# Patient Record
Sex: MALE | Race: WHITE | Employment: FULL TIME | URBAN - METROPOLITAN AREA
[De-identification: names, ages, dates, MRNs, and addresses within clinical notes are randomized per-mention and may not be internally consistent; named-entity substitution may affect disease eponyms.]

---

## 2019-01-07 ENCOUNTER — ANESTHESIA EVENT (OUTPATIENT)
Dept: ENDOSCOPY | Age: 69
End: 2019-01-07
Payer: MEDICARE

## 2019-01-07 ENCOUNTER — HOSPITAL ENCOUNTER (OUTPATIENT)
Age: 69
Setting detail: OUTPATIENT SURGERY
Discharge: HOME | End: 2019-01-07
Attending: INTERNAL MEDICINE | Admitting: INTERNAL MEDICINE
Payer: MEDICARE

## 2019-01-07 ENCOUNTER — ANESTHESIA (OUTPATIENT)
Dept: ENDOSCOPY | Age: 69
End: 2019-01-07
Payer: MEDICARE

## 2019-01-07 VITALS
WEIGHT: 251.19 LBS | BODY MASS INDEX: 38.07 KG/M2 | HEART RATE: 83 BPM | OXYGEN SATURATION: 100 % | HEIGHT: 68 IN | TEMPERATURE: 97.6 F | SYSTOLIC BLOOD PRESSURE: 150 MMHG | DIASTOLIC BLOOD PRESSURE: 72 MMHG | RESPIRATION RATE: 19 BRPM

## 2019-01-07 PROCEDURE — 88305 TISSUE EXAM BY PATHOLOGIST: CPT

## 2019-01-07 PROCEDURE — 77030013992 HC SNR POLYP ENDOSC BSC -B: Performed by: INTERNAL MEDICINE

## 2019-01-07 PROCEDURE — 77030027957 HC TBNG IRR ENDOGTR BUSS -B: Performed by: INTERNAL MEDICINE

## 2019-01-07 PROCEDURE — 76040000019: Performed by: INTERNAL MEDICINE

## 2019-01-07 PROCEDURE — 74011250637 HC RX REV CODE- 250/637: Performed by: INTERNAL MEDICINE

## 2019-01-07 PROCEDURE — 76060000031 HC ANESTHESIA FIRST 0.5 HR: Performed by: INTERNAL MEDICINE

## 2019-01-07 PROCEDURE — 77030010936 HC CLP LIG BSC -C: Performed by: INTERNAL MEDICINE

## 2019-01-07 PROCEDURE — 77030009426 HC FCPS BIOP ENDOSC BSC -B: Performed by: INTERNAL MEDICINE

## 2019-01-07 PROCEDURE — 74011250636 HC RX REV CODE- 250/636

## 2019-01-07 RX ORDER — FLUMAZENIL 0.1 MG/ML
0.2 INJECTION INTRAVENOUS
Status: DISCONTINUED | OUTPATIENT
Start: 2019-01-07 | End: 2019-01-07 | Stop reason: HOSPADM

## 2019-01-07 RX ORDER — SODIUM CHLORIDE 9 MG/ML
100 INJECTION, SOLUTION INTRAVENOUS CONTINUOUS
Status: DISCONTINUED | OUTPATIENT
Start: 2019-01-07 | End: 2019-01-07 | Stop reason: HOSPADM

## 2019-01-07 RX ORDER — NALOXONE HYDROCHLORIDE 0.4 MG/ML
0.4 INJECTION, SOLUTION INTRAMUSCULAR; INTRAVENOUS; SUBCUTANEOUS
Status: DISCONTINUED | OUTPATIENT
Start: 2019-01-07 | End: 2019-01-07 | Stop reason: HOSPADM

## 2019-01-07 RX ORDER — MIDAZOLAM HYDROCHLORIDE 1 MG/ML
.25-1 INJECTION, SOLUTION INTRAMUSCULAR; INTRAVENOUS
Status: DISCONTINUED | OUTPATIENT
Start: 2019-01-07 | End: 2019-01-07 | Stop reason: HOSPADM

## 2019-01-07 RX ORDER — LEVOTHYROXINE SODIUM 125 UG/1
125 TABLET ORAL
COMMUNITY
End: 2019-04-23 | Stop reason: DRUGHIGH

## 2019-01-07 RX ORDER — EPINEPHRINE 0.1 MG/ML
1 INJECTION INTRACARDIAC; INTRAVENOUS
Status: DISCONTINUED | OUTPATIENT
Start: 2019-01-07 | End: 2019-01-07 | Stop reason: HOSPADM

## 2019-01-07 RX ORDER — ATROPINE SULFATE 0.1 MG/ML
0.5 INJECTION INTRAVENOUS
Status: DISCONTINUED | OUTPATIENT
Start: 2019-01-07 | End: 2019-01-07 | Stop reason: HOSPADM

## 2019-01-07 RX ORDER — LISINOPRIL 5 MG/1
5 TABLET ORAL DAILY
COMMUNITY

## 2019-01-07 RX ORDER — SODIUM CHLORIDE 0.9 % (FLUSH) 0.9 %
5-10 SYRINGE (ML) INJECTION EVERY 8 HOURS
Status: DISCONTINUED | OUTPATIENT
Start: 2019-01-07 | End: 2019-01-07 | Stop reason: HOSPADM

## 2019-01-07 RX ORDER — SODIUM CHLORIDE 9 MG/ML
INJECTION, SOLUTION INTRAVENOUS
Status: DISCONTINUED | OUTPATIENT
Start: 2019-01-07 | End: 2019-01-07 | Stop reason: HOSPADM

## 2019-01-07 RX ORDER — DEXTROMETHORPHAN/PSEUDOEPHED 2.5-7.5/.8
1.2 DROPS ORAL
Status: DISCONTINUED | OUTPATIENT
Start: 2019-01-07 | End: 2019-01-07 | Stop reason: HOSPADM

## 2019-01-07 RX ORDER — PROPOFOL 10 MG/ML
INJECTION, EMULSION INTRAVENOUS AS NEEDED
Status: DISCONTINUED | OUTPATIENT
Start: 2019-01-07 | End: 2019-01-07 | Stop reason: HOSPADM

## 2019-01-07 RX ORDER — FENTANYL CITRATE 50 UG/ML
200 INJECTION, SOLUTION INTRAMUSCULAR; INTRAVENOUS
Status: DISCONTINUED | OUTPATIENT
Start: 2019-01-07 | End: 2019-01-07 | Stop reason: HOSPADM

## 2019-01-07 RX ORDER — LIDOCAINE HYDROCHLORIDE 20 MG/ML
INJECTION, SOLUTION EPIDURAL; INFILTRATION; INTRACAUDAL; PERINEURAL AS NEEDED
Status: DISCONTINUED | OUTPATIENT
Start: 2019-01-07 | End: 2019-01-07 | Stop reason: HOSPADM

## 2019-01-07 RX ORDER — SODIUM CHLORIDE 0.9 % (FLUSH) 0.9 %
5-10 SYRINGE (ML) INJECTION AS NEEDED
Status: DISCONTINUED | OUTPATIENT
Start: 2019-01-07 | End: 2019-01-07 | Stop reason: HOSPADM

## 2019-01-07 RX ADMIN — SIMETHICONE 80 MG: 20 SUSPENSION/ DROPS ORAL at 11:02

## 2019-01-07 RX ADMIN — PROPOFOL 40 MG: 10 INJECTION, EMULSION INTRAVENOUS at 10:58

## 2019-01-07 RX ADMIN — PROPOFOL 30 MG: 10 INJECTION, EMULSION INTRAVENOUS at 11:15

## 2019-01-07 RX ADMIN — PROPOFOL 50 MG: 10 INJECTION, EMULSION INTRAVENOUS at 10:56

## 2019-01-07 RX ADMIN — PROPOFOL 30 MG: 10 INJECTION, EMULSION INTRAVENOUS at 11:13

## 2019-01-07 RX ADMIN — SODIUM CHLORIDE: 9 INJECTION, SOLUTION INTRAVENOUS at 10:53

## 2019-01-07 RX ADMIN — PROPOFOL 30 MG: 10 INJECTION, EMULSION INTRAVENOUS at 11:11

## 2019-01-07 RX ADMIN — PROPOFOL 30 MG: 10 INJECTION, EMULSION INTRAVENOUS at 11:03

## 2019-01-07 RX ADMIN — PROPOFOL 50 MG: 10 INJECTION, EMULSION INTRAVENOUS at 10:54

## 2019-01-07 RX ADMIN — PROPOFOL 30 MG: 10 INJECTION, EMULSION INTRAVENOUS at 11:01

## 2019-01-07 RX ADMIN — PROPOFOL 30 MG: 10 INJECTION, EMULSION INTRAVENOUS at 11:08

## 2019-01-07 RX ADMIN — PROPOFOL 30 MG: 10 INJECTION, EMULSION INTRAVENOUS at 11:06

## 2019-01-07 RX ADMIN — LIDOCAINE HYDROCHLORIDE 50 MG: 20 INJECTION, SOLUTION EPIDURAL; INFILTRATION; INTRACAUDAL; PERINEURAL at 10:54

## 2019-01-07 RX ADMIN — PROPOFOL 30 MG: 10 INJECTION, EMULSION INTRAVENOUS at 10:59

## 2019-01-07 NOTE — PROCEDURES
[address]  [facility], [address]      Colonoscopy Operative Report    Edil Lou  [id number]  1950      Procedure Type:   Colonoscopy with polypectomy (snare cautery), polypectomy (hot biopsy)     Indications:    Occult blood in stool   First colonoscopy    Pre-operative Diagnosis: see indication above    Post-operative Diagnosis:  See findings below    :  Sandi Bland MD      Referring Provider: Nahun Rodriguez MD      Sedation:  MAC anesthesia Propofol      Procedure Details:  After informed consent was obtained with all risks and benefits of procedure explained and preoperative exam completed, the patient was taken to the endoscopy suite and placed in the left lateral decubitus position. Upon sequential sedation as per above, a digital rectal exam was performed demonstrating internal hemorrhoids. The Olympus videocolonoscope  was inserted in the rectum and carefully advanced to the cecum, which was identified by the ileocecal valve and appendiceal orifice. The cecum was identified by the ileocecal valve and appendiceal orifice. The quality of preparation was good. The colonoscope was slowly withdrawn with careful evaluation between folds. Retroflexion in the rectum was completed . Findings:   Rectum: normal  Sigmoid: mild diverticulosis  2 cm polyp removed by hot snaring, one hemoclip placed at site  Descending Colon: 8 mm polyp removed by hot biopsy  Transverse Colon: 2 cm polyp removed by hot snaring, one hemoclip placed at site  Ascending Colon: 3 polyps ranging I size between 7 and 9 mm removed by hot biopsies  Cecum: normal        Specimen Removed:  as above    Complications: None. EBL:  None. Impression:    see findings    Recommendations: --Await pathology.       Recommendation for next colonscopy in 3 years  High fiber diet    Signed By: Sandi Bland MD     1/7/2019  11:20 AM

## 2019-01-07 NOTE — ANESTHESIA POSTPROCEDURE EVALUATION
Post-Anesthesia Evaluation and Assessment Patient: Ariana German MRN: [id number]  SSN: [id number] YOB: 1950  Age: 79 y.o. Sex: male I have evaluated the patient and they are stable and ready for discharge from the PACU. Cardiovascular Function/Vital Signs Visit Vitals BP (!) 147/113 Pulse 94 Temp 36.5 °C (97.7 °F) Resp 18 Ht 5' 8\" (1.727 m) Wt 113.9 kg (251 lb 3 oz) SpO2 98% BMI 38.19 kg/m² Patient is status post MAC anesthesia for Procedure(s): 
COLONOSCOPY 
ENDOSCOPIC POLYPECTOMY RESOLUTION CLIP. Nausea/Vomiting: None Postoperative hydration reviewed and adequate. Pain: 
Pain Scale 1: Numeric (0 - 10) (01/07/19 1040) Pain Intensity 1: 0 (01/07/19 1040) Managed Neurological Status: At baseline Mental Status, Level of Consciousness: Alert and  oriented to person, place, and time Pulmonary Status:  
O2 Device: CO2 nasal cannula (01/07/19 1117) Adequate oxygenation and airway patent Complications related to anesthesia: None Post-anesthesia assessment completed. No concerns Signed By: Betsy Cabrera MD   
 January 7, 2019 Procedure(s): 
COLONOSCOPY 
ENDOSCOPIC POLYPECTOMY RESOLUTION CLIP. 
 
<BSHSIANPOST> Visit Vitals BP (!) 147/113 Pulse 94 Temp 36.5 °C (97.7 °F) Resp 18 Ht 5' 8\" (1.727 m) Wt 113.9 kg (251 lb 3 oz) SpO2 98% BMI 38.19 kg/m²

## 2019-01-07 NOTE — PROGRESS NOTES

## 2019-01-07 NOTE — DISCHARGE INSTRUCTIONS
[address] [facility]  [id number]  1950    Discomfort:  Redness at IV site- apply warm compress to area; if redness or soreness persist- contact your physician  There may be a slight amount of blood passed from the rectum  Gaseous discomfort- walking, belching will help relieve any discomfort  You may not operate a vehicle for 12 hours  You may not engage in an occupation involving machinery or appliances for rest of today  You may not drink alcoholic beverages for at least 12 hours  Avoid making any critical decisions for at least 24 hour  DIET:  You may resume your regular diet - however -  remember your colon is empty and a heavy meal will produce gas. Avoid these foods:  vegetables, fried / greasy foods, carbonated drinks     ACTIVITY:  You may  resume your normal daily activities it is recommended that you spend the remainder of the day resting -  avoid any strenuous activity. CALL M.D.   ANY SIGN OF:   Increasing pain, nausea, vomiting  Abdominal distension (swelling)  New increased bleeding (oral or rectal)  Fever (chills)  Pain in chest area  Bloody discharge from nose or mouth  Shortness of breath      Follow-up Instructions:   Call Dr. Sindi Rodriguez for any questions or problems at [phone]   High fiber diet        ENDOSCOPY FINDINGS:   Your colonoscopy showed 6 polyps I removed and diverticulosis     Telephone # [phone]      Signed By: Sindi Rodriguez MD     1/7/2019  11:23 AM       DISCHARGE SUMMARY from Nurse    The following personal items collected during your admission are returned to you:   Dental Appliance: Dental Appliances: None  Vision: Visual Aid: Other (comment)  Hearing Aid:    Jewelry:    Clothing:    Other Valuables:    Valuables sent to safe:

## 2019-01-07 NOTE — H&P
The patient is a 79year old male who presents with a complaint of Hemoccult Positive Stool. The patient presents consultation at the request of Dr. Dunia Abraham). The onset of the hemoccult positive stool has been chronic. There has been no associated abdominal pain, change in bowel habits, change in caliber of stools, dizziness, easy bruising, family history of bleeding, family history of colon cancer, hard stools, heartburn, hematemesis, ingestion of beets, ingestion of bismuth, ingestion of iron pills, mucus or pus with stool, nausea, painful bowel movements, use of adrenal steroids, use of aspirin, use of indomethacin, use of anti-coagulants, vomiting, weight loss, constipation, diarrhea, straining on bowel movements, hematochezia, history of gastrointestinal bleeding, ulcer disease, use of NSAIDs, previous gastrointestinal bleeding or history of anemia. Note for \"Hemoccult Positive Stool\": he never had colonoscopy done before, he had positive blood test in stool Problem List/Past Medical Krishna Pak; 11/14/2018 9:32 AM) Blood in stool (578.1  K92.1)   Thyroid condition (246.9  E07.9)   
 
Past Surgical History Krishna Pak; 11/14/2018 9:32 AM) Non-Contributory Past Surgical History   
 
Allergies Krishna Pak; 11/14/2018 9:32 AM) No Known Drug Allergies  [11/05/2018]: 
No Known Allergies  [11/05/2018]: Medication History Krishna Pak; 11/14/2018 9:34 AM) Levothyroxine Sodium  (125MCG Capsule, Oral) Active. Lisinopril  (5MG Tablet, Oral) Active. Medications Reconciled  
 
Family History Krishna Pak; 11/14/2018 9:32 AM) Colon Polyps   First Degree Relatives. Social History Krishna Pak; 11/14/2018 9:32 AM) Blood Transfusion   No. 
Alcohol Use   Has never drank. Employment status   Full-time. Marital status   . Tobacco Use   Former smoker. Health Maintenance History Krishna Pak; 11/14/2018 9:32 AM) Flu Vaccine   none Pneumovax   none Review of Systems ([id number] Wicho Alexander; 11/14/2018 9:34 AM) General Not Present- Chronic Fatigue, Poor Appetite, Weight Gain and Weight Loss. Skin Not Present- Itching, Rash and Skin Color Changes. HEENT Not Present- Hearing Loss and Vertigo. Respiratory Not Present- Difficulty Breathing and TB exposure. Cardiovascular Not Present- Chest Pain, Use of Antibiotics before Dental Procedures and Use of Blood Thinners. Gastrointestinal Present- See HPI. Musculoskeletal Not Present- Arthritis, Hip Replacement Surgery and Knee Replacement Surgery. Neurological Not Present- Weakness. Psychiatric Not Present- Depression. Endocrine Present- Thyroid Problems. Not Present- Diabetes. Hematology Not Present- Anemia. Vitals Gissell [facility] MITCHELL Alexander; 11/14/2018 9:37 AM) 11/14/2018 9:31 AM 
Weight: 253 lb   Height: 68 in  
Body Surface Area: 2.26 m²   Body Mass Index: 38.47 kg/m²   
Pulse: 80 (Regular)    Resp.: 18 (Unlabored)    
BP: 132/72 (Sitting, Left Arm, Standard) Physical Exam (George Mckeon MD; 11/14/2018 1:00 PM) General 
Mental Status - Alert. General Appearance - Cooperative, Pleasant, Not in acute distress. Orientation - Oriented X3. Build & Nutrition - Well nourished and Well developed. Integumentary General Characteristics Overall examination of the patient's skin reveals - no rashes, no bruises and no spider angiomas. Color - normal coloration of skin. Head and Neck Neck Global Assessment - full range of motion and supple, no bruit auscultated on the right, no bruit auscultated on the left, non-tender, no lymphadenopathy. Thyroid Gland Characteristics - normal size and consistency. Eye Eyeball - Left - No Exophthalmos. Eyeball - Right - No Exophthalmos. Sclera/Conjunctiva - Left - No Jaundice. Sclera/Conjunctiva - Right - No Jaundice.  
 
Chest and Lung Exam 
 Chest and lung exam reveals  - quiet, even and easy respiratory effort with no use of accessory muscles. Auscultation Breath sounds - Normal. Adventitious sounds - No Adventitious sounds. Cardiovascular Auscultation Rhythm - Regular, No Tachycardia, No Bradycardia . Heart Sounds - Normal heart sounds , S1 WNL and S2 WNL, No S3, No Summation Gallop. Murmurs & Other Heart Sounds - Auscultation of the heart reveals - No Murmurs. Abdomen Palpation/Percussion Tenderness - Non-Tender. Rebound tenderness - No rebound. Rigidity (guarding) - No Rigidity. Dullness to percussion - No abnormal dullness to percussion. Liver - No hepatosplenomegaly. Abdominal Mass Palpable - No masses. Other Characteristics - No Ascites. Auscultation Auscultation of the abdomen reveals - Bowel sounds normal, No Abdominal bruits and No Succussion splash. Rectal - Did not examine. Peripheral Vascular Upper Extremity Inspection - Left - Normal - No Clubbing, No Cyanosis, No Edema, Pulses Intact. Right - Normal - No Clubbing, No Cyanosis, No Edema, Pulses Intact. Palpation - Edema - Left - No edema. Right - No edema. Lower Extremity Inspection - Left - Inspection Normal. Right - Inspection Normal. Palpation - Edema - Left - No edema. Right - No edema. Neurologic Neurologic evaluation reveals  - Cranial nerves grossly intact, no focal neurologic deficits. Motor Involuntary Movements - Asterixis - not present. Musculoskeletal 
Global Assessment Gait and Station - normal gait and station. Assessment & Plan Jennifer Charlton MD; 11/14/2018 1:01 PM) Blood in stool (578.1  K92.1) Impression: he never had colonoscopy done before, he had positive blood test in stool 
patient was agreeable to get colonoscopy to r/o any colonic neoplasm 
given suprep sample for prep Current Plans Colonoscopy (96313) (Discussed risks and benefits with the patient to include:; perforation, post polypectomy, or post biopsy bleeding, missed lesions, and sedation reactions.) Pt Education - How to access health information online: discussed with patient and provided information. Patient is to call me for any questions or concerns. Date of Surgery Update: 
Tania Moise was seen and examined. History and physical has been reviewed. The patient has been examined.  There have been no significant clinical changes since the completion of the originally dated History and Physical. 
 
Signed By: Yann Jones MD   
 January 7, 2019 10:26 AM

## 2019-04-23 ENCOUNTER — APPOINTMENT (OUTPATIENT)
Dept: GENERAL RADIOLOGY | Age: 69
DRG: 247 | End: 2019-04-23
Attending: EMERGENCY MEDICINE
Payer: MEDICARE

## 2019-04-23 ENCOUNTER — HOSPITAL ENCOUNTER (INPATIENT)
Age: 69
LOS: 2 days | Discharge: HOME HEALTH | DRG: 247 | End: 2019-04-25
Attending: EMERGENCY MEDICINE | Admitting: INTERNAL MEDICINE
Payer: MEDICARE

## 2019-04-23 DIAGNOSIS — I25.119 CORONARY ARTERY DISEASE INVOLVING NATIVE HEART WITH ANGINA PECTORIS, UNSPECIFIED VESSEL OR LESION TYPE (HCC): ICD-10-CM

## 2019-04-23 DIAGNOSIS — R07.9 CHEST PAIN, UNSPECIFIED TYPE: ICD-10-CM

## 2019-04-23 DIAGNOSIS — I21.4 NSTEMI (NON-ST ELEVATED MYOCARDIAL INFARCTION) (HCC): Primary | ICD-10-CM

## 2019-04-23 LAB
ALBUMIN SERPL-MCNC: 3.4 G/DL (ref 3.5–5)
ALBUMIN/GLOB SERPL: 0.8 {RATIO} (ref 1.1–2.2)
ALP SERPL-CCNC: 69 U/L (ref 45–117)
ALT SERPL-CCNC: 47 U/L (ref 12–78)
AMYLASE SERPL-CCNC: 23 U/L (ref 25–115)
ANION GAP SERPL CALC-SCNC: 6 MMOL/L (ref 5–15)
APTT PPP: 27.4 SEC (ref 22.1–32)
APTT PPP: 29.9 SEC (ref 22.1–32)
AST SERPL-CCNC: 29 U/L (ref 15–37)
ATRIAL RATE: 105 BPM
BASOPHILS # BLD: 0.1 K/UL (ref 0–0.1)
BASOPHILS NFR BLD: 1 % (ref 0–1)
BILIRUB SERPL-MCNC: 0.2 MG/DL (ref 0.2–1)
BNP SERPL-MCNC: 260 PG/ML
BNP SERPL-MCNC: 298 PG/ML
BUN SERPL-MCNC: 20 MG/DL (ref 6–20)
BUN/CREAT SERPL: 13 (ref 12–20)
CALCIUM SERPL-MCNC: 9.1 MG/DL (ref 8.5–10.1)
CALCULATED P AXIS, ECG09: 54 DEGREES
CALCULATED R AXIS, ECG10: 39 DEGREES
CALCULATED T AXIS, ECG11: 75 DEGREES
CHLORIDE SERPL-SCNC: 105 MMOL/L (ref 97–108)
CK MB CFR SERPL CALC: 2.1 % (ref 0–2.5)
CK MB SERPL-MCNC: 2.9 NG/ML (ref 5–25)
CK SERPL-CCNC: 136 U/L (ref 39–308)
CO2 SERPL-SCNC: 27 MMOL/L (ref 21–32)
COMMENT, HOLDF: NORMAL
CREAT SERPL-MCNC: 1.58 MG/DL (ref 0.7–1.3)
D DIMER PPP FEU-MCNC: <0.19 MG/L FEU (ref 0–0.65)
DIAGNOSIS, 93000: NORMAL
DIFFERENTIAL METHOD BLD: ABNORMAL
EOSINOPHIL # BLD: 0.2 K/UL (ref 0–0.4)
EOSINOPHIL NFR BLD: 2 % (ref 0–7)
ERYTHROCYTE [DISTWIDTH] IN BLOOD BY AUTOMATED COUNT: 13.2 % (ref 11.5–14.5)
EST. AVERAGE GLUCOSE BLD GHB EST-MCNC: 126 MG/DL
GLOBULIN SER CALC-MCNC: 4.3 G/DL (ref 2–4)
GLUCOSE SERPL-MCNC: 127 MG/DL (ref 65–100)
HBA1C MFR BLD: 6 % (ref 4.2–6.3)
HCT VFR BLD AUTO: 47.2 % (ref 36.6–50.3)
HGB BLD-MCNC: 16.1 G/DL (ref 12.1–17)
IMM GRANULOCYTES # BLD AUTO: 0.1 K/UL (ref 0–0.04)
IMM GRANULOCYTES NFR BLD AUTO: 1 % (ref 0–0.5)
INR PPP: 1 (ref 0.9–1.1)
LIPASE SERPL-CCNC: 52 U/L (ref 73–393)
LYMPHOCYTES # BLD: 1.8 K/UL (ref 0.8–3.5)
LYMPHOCYTES NFR BLD: 24 % (ref 12–49)
MAGNESIUM SERPL-MCNC: 2.3 MG/DL (ref 1.6–2.4)
MCH RBC QN AUTO: 32.6 PG (ref 26–34)
MCHC RBC AUTO-ENTMCNC: 34.1 G/DL (ref 30–36.5)
MCV RBC AUTO: 95.5 FL (ref 80–99)
MONOCYTES # BLD: 0.7 K/UL (ref 0–1)
MONOCYTES NFR BLD: 9 % (ref 5–13)
NEUTS SEG # BLD: 4.7 K/UL (ref 1.8–8)
NEUTS SEG NFR BLD: 63 % (ref 32–75)
NRBC # BLD: 0 K/UL (ref 0–0.01)
NRBC BLD-RTO: 0 PER 100 WBC
P-R INTERVAL, ECG05: 176 MS
PHOSPHATE SERPL-MCNC: 2.5 MG/DL (ref 2.6–4.7)
PLATELET # BLD AUTO: 259 K/UL (ref 150–400)
PMV BLD AUTO: 9.1 FL (ref 8.9–12.9)
POTASSIUM SERPL-SCNC: 3.8 MMOL/L (ref 3.5–5.1)
PROT SERPL-MCNC: 7.7 G/DL (ref 6.4–8.2)
PROTHROMBIN TIME: 9.9 SEC (ref 9–11.1)
Q-T INTERVAL, ECG07: 348 MS
QRS DURATION, ECG06: 72 MS
QTC CALCULATION (BEZET), ECG08: 459 MS
RBC # BLD AUTO: 4.94 M/UL (ref 4.1–5.7)
SAMPLES BEING HELD,HOLD: NORMAL
SODIUM SERPL-SCNC: 138 MMOL/L (ref 136–145)
THERAPEUTIC RANGE,PTTT: NORMAL SECS (ref 58–77)
THERAPEUTIC RANGE,PTTT: NORMAL SECS (ref 58–77)
TROPONIN I SERPL-MCNC: 0.13 NG/ML
TROPONIN I SERPL-MCNC: 0.2 NG/ML
TSH SERPL DL<=0.05 MIU/L-ACNC: 4.53 UIU/ML (ref 0.36–3.74)
VENTRICULAR RATE, ECG03: 105 BPM
WBC # BLD AUTO: 7.5 K/UL (ref 4.1–11.1)

## 2019-04-23 PROCEDURE — 74011250636 HC RX REV CODE- 250/636: Performed by: INTERNAL MEDICINE

## 2019-04-23 PROCEDURE — C1887 CATHETER, GUIDING: HCPCS | Performed by: INTERNAL MEDICINE

## 2019-04-23 PROCEDURE — C1769 GUIDE WIRE: HCPCS | Performed by: INTERNAL MEDICINE

## 2019-04-23 PROCEDURE — 92928 PRQ TCAT PLMT NTRAC ST 1 LES: CPT | Performed by: INTERNAL MEDICINE

## 2019-04-23 PROCEDURE — 83735 ASSAY OF MAGNESIUM: CPT

## 2019-04-23 PROCEDURE — 74011250637 HC RX REV CODE- 250/637: Performed by: INTERNAL MEDICINE

## 2019-04-23 PROCEDURE — 74011000258 HC RX REV CODE- 258: Performed by: INTERNAL MEDICINE

## 2019-04-23 PROCEDURE — 82550 ASSAY OF CK (CPK): CPT

## 2019-04-23 PROCEDURE — 77030013715 HC INFL SYS MRTM -B: Performed by: INTERNAL MEDICINE

## 2019-04-23 PROCEDURE — 85610 PROTHROMBIN TIME: CPT

## 2019-04-23 PROCEDURE — C1874 STENT, COATED/COV W/DEL SYS: HCPCS | Performed by: INTERNAL MEDICINE

## 2019-04-23 PROCEDURE — 74011000250 HC RX REV CODE- 250: Performed by: INTERNAL MEDICINE

## 2019-04-23 PROCEDURE — 77030010221 HC SPLNT WR POS TELE -B: Performed by: INTERNAL MEDICINE

## 2019-04-23 PROCEDURE — 93458 L HRT ARTERY/VENTRICLE ANGIO: CPT | Performed by: INTERNAL MEDICINE

## 2019-04-23 PROCEDURE — 82150 ASSAY OF AMYLASE: CPT

## 2019-04-23 PROCEDURE — C1894 INTRO/SHEATH, NON-LASER: HCPCS | Performed by: INTERNAL MEDICINE

## 2019-04-23 PROCEDURE — 99153 MOD SED SAME PHYS/QHP EA: CPT | Performed by: INTERNAL MEDICINE

## 2019-04-23 PROCEDURE — 83880 ASSAY OF NATRIURETIC PEPTIDE: CPT

## 2019-04-23 PROCEDURE — B2111ZZ FLUOROSCOPY OF MULTIPLE CORONARY ARTERIES USING LOW OSMOLAR CONTRAST: ICD-10-PCS | Performed by: INTERNAL MEDICINE

## 2019-04-23 PROCEDURE — 80053 COMPREHEN METABOLIC PANEL: CPT

## 2019-04-23 PROCEDURE — 85025 COMPLETE CBC W/AUTO DIFF WBC: CPT

## 2019-04-23 PROCEDURE — 77030004532 HC CATH ANGI DX IMP BSC -A: Performed by: INTERNAL MEDICINE

## 2019-04-23 PROCEDURE — 71046 X-RAY EXAM CHEST 2 VIEWS: CPT

## 2019-04-23 PROCEDURE — 99284 EMERGENCY DEPT VISIT MOD MDM: CPT

## 2019-04-23 PROCEDURE — 74011250636 HC RX REV CODE- 250/636

## 2019-04-23 PROCEDURE — 93005 ELECTROCARDIOGRAM TRACING: CPT

## 2019-04-23 PROCEDURE — 77030013744: Performed by: INTERNAL MEDICINE

## 2019-04-23 PROCEDURE — 99152 MOD SED SAME PHYS/QHP 5/>YRS: CPT | Performed by: INTERNAL MEDICINE

## 2019-04-23 PROCEDURE — 36415 COLL VENOUS BLD VENIPUNCTURE: CPT

## 2019-04-23 PROCEDURE — 83036 HEMOGLOBIN GLYCOSYLATED A1C: CPT

## 2019-04-23 PROCEDURE — C1725 CATH, TRANSLUMIN NON-LASER: HCPCS | Performed by: INTERNAL MEDICINE

## 2019-04-23 PROCEDURE — 84484 ASSAY OF TROPONIN QUANT: CPT

## 2019-04-23 PROCEDURE — 65660000000 HC RM CCU STEPDOWN

## 2019-04-23 PROCEDURE — 027034Z DILATION OF CORONARY ARTERY, ONE ARTERY WITH DRUG-ELUTING INTRALUMINAL DEVICE, PERCUTANEOUS APPROACH: ICD-10-PCS | Performed by: INTERNAL MEDICINE

## 2019-04-23 PROCEDURE — 74011636320 HC RX REV CODE- 636/320: Performed by: INTERNAL MEDICINE

## 2019-04-23 PROCEDURE — 85379 FIBRIN DEGRADATION QUANT: CPT

## 2019-04-23 PROCEDURE — 77030019569 HC BND COMPR RAD TERU -B: Performed by: INTERNAL MEDICINE

## 2019-04-23 PROCEDURE — 77030012468 HC VLV BLEEDBK CNTRL ABBT -B: Performed by: INTERNAL MEDICINE

## 2019-04-23 PROCEDURE — 84443 ASSAY THYROID STIM HORMONE: CPT

## 2019-04-23 PROCEDURE — 85730 THROMBOPLASTIN TIME PARTIAL: CPT

## 2019-04-23 PROCEDURE — 4A023N7 MEASUREMENT OF CARDIAC SAMPLING AND PRESSURE, LEFT HEART, PERCUTANEOUS APPROACH: ICD-10-PCS | Performed by: INTERNAL MEDICINE

## 2019-04-23 PROCEDURE — 84100 ASSAY OF PHOSPHORUS: CPT

## 2019-04-23 PROCEDURE — 83690 ASSAY OF LIPASE: CPT

## 2019-04-23 DEVICE — STENT RONYX30022W RESOLUTE ONYX 3.00X22
Type: IMPLANTABLE DEVICE | Site: HEART | Status: FUNCTIONAL
Brand: RESOLUTE ONYX™

## 2019-04-23 RX ORDER — SODIUM CHLORIDE 0.9 % (FLUSH) 0.9 %
SYRINGE (ML) INJECTION AS NEEDED
Status: DISCONTINUED | OUTPATIENT
Start: 2019-04-23 | End: 2019-04-23 | Stop reason: HOSPADM

## 2019-04-23 RX ORDER — METOPROLOL TARTRATE 25 MG/1
12.5 TABLET, FILM COATED ORAL EVERY 12 HOURS
Status: DISCONTINUED | OUTPATIENT
Start: 2019-04-23 | End: 2019-04-25 | Stop reason: HOSPADM

## 2019-04-23 RX ORDER — MIDAZOLAM HYDROCHLORIDE 1 MG/ML
INJECTION, SOLUTION INTRAMUSCULAR; INTRAVENOUS AS NEEDED
Status: DISCONTINUED | OUTPATIENT
Start: 2019-04-23 | End: 2019-04-23 | Stop reason: HOSPADM

## 2019-04-23 RX ORDER — SODIUM CHLORIDE 0.9 % (FLUSH) 0.9 %
5-40 SYRINGE (ML) INJECTION EVERY 8 HOURS
Status: DISCONTINUED | OUTPATIENT
Start: 2019-04-23 | End: 2019-04-25 | Stop reason: HOSPADM

## 2019-04-23 RX ORDER — FENTANYL CITRATE 50 UG/ML
INJECTION, SOLUTION INTRAMUSCULAR; INTRAVENOUS AS NEEDED
Status: DISCONTINUED | OUTPATIENT
Start: 2019-04-23 | End: 2019-04-23 | Stop reason: HOSPADM

## 2019-04-23 RX ORDER — SODIUM CHLORIDE 0.9 % (FLUSH) 0.9 %
5-40 SYRINGE (ML) INJECTION AS NEEDED
Status: DISCONTINUED | OUTPATIENT
Start: 2019-04-23 | End: 2019-04-25 | Stop reason: HOSPADM

## 2019-04-23 RX ORDER — VERAPAMIL HYDROCHLORIDE 2.5 MG/ML
INJECTION, SOLUTION INTRAVENOUS AS NEEDED
Status: DISCONTINUED | OUTPATIENT
Start: 2019-04-23 | End: 2019-04-23 | Stop reason: HOSPADM

## 2019-04-23 RX ORDER — SODIUM CHLORIDE, SODIUM LACTATE, POTASSIUM CHLORIDE, CALCIUM CHLORIDE 600; 310; 30; 20 MG/100ML; MG/100ML; MG/100ML; MG/100ML
75 INJECTION, SOLUTION INTRAVENOUS CONTINUOUS
Status: DISCONTINUED | OUTPATIENT
Start: 2019-04-23 | End: 2019-04-25 | Stop reason: HOSPADM

## 2019-04-23 RX ORDER — SODIUM CHLORIDE 9 MG/ML
INJECTION, SOLUTION INTRAVENOUS
Status: COMPLETED | OUTPATIENT
Start: 2019-04-23 | End: 2019-04-23

## 2019-04-23 RX ORDER — ACETAMINOPHEN 325 MG/1
650 TABLET ORAL
Status: DISCONTINUED | OUTPATIENT
Start: 2019-04-23 | End: 2019-04-25 | Stop reason: HOSPADM

## 2019-04-23 RX ORDER — LIDOCAINE HYDROCHLORIDE 10 MG/ML
INJECTION INFILTRATION; PERINEURAL AS NEEDED
Status: DISCONTINUED | OUTPATIENT
Start: 2019-04-23 | End: 2019-04-23 | Stop reason: HOSPADM

## 2019-04-23 RX ORDER — HEPARIN SODIUM 1000 [USP'U]/ML
INJECTION, SOLUTION INTRAVENOUS; SUBCUTANEOUS AS NEEDED
Status: DISCONTINUED | OUTPATIENT
Start: 2019-04-23 | End: 2019-04-23 | Stop reason: HOSPADM

## 2019-04-23 RX ORDER — LEVOTHYROXINE SODIUM 137 UG/1
137 TABLET ORAL
COMMUNITY

## 2019-04-23 RX ORDER — LEVOTHYROXINE SODIUM 125 UG/1
125 TABLET ORAL
Status: DISCONTINUED | OUTPATIENT
Start: 2019-04-23 | End: 2019-04-23 | Stop reason: DRUGHIGH

## 2019-04-23 RX ORDER — NITROGLYCERIN 0.4 MG/1
0.4 TABLET SUBLINGUAL
Status: DISCONTINUED | OUTPATIENT
Start: 2019-04-23 | End: 2019-04-25 | Stop reason: HOSPADM

## 2019-04-23 RX ORDER — IBUPROFEN 200 MG
200 TABLET ORAL AS NEEDED
COMMUNITY
End: 2019-04-25

## 2019-04-23 RX ORDER — HEPARIN SODIUM 10000 [USP'U]/100ML
8-25 INJECTION, SOLUTION INTRAVENOUS
Status: DISCONTINUED | OUTPATIENT
Start: 2019-04-23 | End: 2019-04-24 | Stop reason: ALTCHOICE

## 2019-04-23 RX ORDER — GUAIFENESIN 100 MG/5ML
81 LIQUID (ML) ORAL DAILY
Status: DISCONTINUED | OUTPATIENT
Start: 2019-04-23 | End: 2019-04-25 | Stop reason: HOSPADM

## 2019-04-23 RX ORDER — HEPARIN SODIUM 5000 [USP'U]/ML
4000 INJECTION, SOLUTION INTRAVENOUS; SUBCUTANEOUS ONCE
Status: COMPLETED | OUTPATIENT
Start: 2019-04-23 | End: 2019-04-23

## 2019-04-23 RX ORDER — SODIUM CHLORIDE 9 MG/ML
1.5 INJECTION, SOLUTION INTRAVENOUS CONTINUOUS
Status: DISPENSED | OUTPATIENT
Start: 2019-04-23 | End: 2019-04-23

## 2019-04-23 RX ORDER — ONDANSETRON 2 MG/ML
4 INJECTION INTRAMUSCULAR; INTRAVENOUS
Status: DISCONTINUED | OUTPATIENT
Start: 2019-04-23 | End: 2019-04-25 | Stop reason: HOSPADM

## 2019-04-23 RX ORDER — HEPARIN SODIUM 200 [USP'U]/100ML
INJECTION, SOLUTION INTRAVENOUS
Status: COMPLETED | OUTPATIENT
Start: 2019-04-23 | End: 2019-04-23

## 2019-04-23 RX ORDER — ACETAMINOPHEN 500 MG
500 TABLET ORAL
COMMUNITY

## 2019-04-23 RX ORDER — BISACODYL 5 MG
5 TABLET, DELAYED RELEASE (ENTERIC COATED) ORAL DAILY PRN
Status: DISCONTINUED | OUTPATIENT
Start: 2019-04-23 | End: 2019-04-25 | Stop reason: HOSPADM

## 2019-04-23 RX ORDER — CLOPIDOGREL 300 MG/1
TABLET, FILM COATED ORAL AS NEEDED
Status: DISCONTINUED | OUTPATIENT
Start: 2019-04-23 | End: 2019-04-23 | Stop reason: HOSPADM

## 2019-04-23 RX ORDER — MORPHINE SULFATE 2 MG/ML
2 INJECTION, SOLUTION INTRAMUSCULAR; INTRAVENOUS
Status: DISCONTINUED | OUTPATIENT
Start: 2019-04-23 | End: 2019-04-25 | Stop reason: HOSPADM

## 2019-04-23 RX ORDER — CLOPIDOGREL BISULFATE 75 MG/1
75 TABLET ORAL DAILY
Status: DISCONTINUED | OUTPATIENT
Start: 2019-04-24 | End: 2019-04-25 | Stop reason: HOSPADM

## 2019-04-23 RX ORDER — LISINOPRIL 5 MG/1
5 TABLET ORAL DAILY
Status: DISCONTINUED | OUTPATIENT
Start: 2019-04-23 | End: 2019-04-25 | Stop reason: HOSPADM

## 2019-04-23 RX ORDER — DIPHENHYDRAMINE HYDROCHLORIDE 50 MG/ML
INJECTION, SOLUTION INTRAMUSCULAR; INTRAVENOUS AS NEEDED
Status: DISCONTINUED | OUTPATIENT
Start: 2019-04-23 | End: 2019-04-23 | Stop reason: HOSPADM

## 2019-04-23 RX ADMIN — HEPARIN SODIUM AND DEXTROSE 8 UNITS/KG/HR: 10000; 5 INJECTION INTRAVENOUS at 04:13

## 2019-04-23 RX ADMIN — ASPIRIN 81 MG CHEWABLE TABLET 81 MG: 81 TABLET CHEWABLE at 08:42

## 2019-04-23 RX ADMIN — HEPARIN SODIUM 4000 UNITS: 5000 INJECTION, SOLUTION INTRAVENOUS; SUBCUTANEOUS at 04:11

## 2019-04-23 RX ADMIN — LEVOTHYROXINE SODIUM 137 MCG: 0.11 TABLET ORAL at 08:38

## 2019-04-23 RX ADMIN — Medication 10 ML: at 22:10

## 2019-04-23 RX ADMIN — SODIUM CHLORIDE, SODIUM LACTATE, POTASSIUM CHLORIDE, AND CALCIUM CHLORIDE 75 ML/HR: 600; 310; 30; 20 INJECTION, SOLUTION INTRAVENOUS at 07:39

## 2019-04-23 RX ADMIN — SODIUM CHLORIDE 1.5 ML/KG/HR: 900 INJECTION, SOLUTION INTRAVENOUS at 16:43

## 2019-04-23 RX ADMIN — HEPARIN SODIUM 4000 UNITS: 5000 INJECTION, SOLUTION INTRAVENOUS; SUBCUTANEOUS at 11:37

## 2019-04-23 RX ADMIN — METOPROLOL TARTRATE 12.5 MG: 25 TABLET ORAL at 08:42

## 2019-04-23 RX ADMIN — METOPROLOL TARTRATE 12.5 MG: 25 TABLET ORAL at 20:08

## 2019-04-23 RX ADMIN — ACETAMINOPHEN 650 MG: 325 TABLET ORAL at 17:45

## 2019-04-23 NOTE — Clinical Note
Lesion: Located in the Distal RCA. Re-inflated stent balloon used. First inflation pressure = 12 shanique; inflation time: 8 sec.  Stent Balloon

## 2019-04-23 NOTE — PROCEDURES
Cardiac Catheterization Procedure Note   Patient: Danny Moreno  MRN: [id number]  SSN: [id number]   YOB: 1950 Age: 79 y.o.   Sex: male    Date of Procedure: 4/23/2019   Pre-procedure Diagnosis: NSTEMI  Post-procedure Diagnosis: Coronary Artery Disease  Procedure: Left Heart Cath, PCI and to LAD  :  Dr. Joel Riggins MD    Assistant(s):  None  Anesthesia: Moderate Sedation   Estimated Blood Loss: Less than 10 mL   Specimens Removed: None  Findings: severe mid LAD stenosis with ABHILASH 2 flow, moderate mid CX stenosis, severe bifurcation lesion in the distal RCA with total occlusion of the PDA branch with ABHILASH 0 flow, normal LV EF at 60%, low LV end diastolic filling pressure, PCI of mid LAD with a 2.5 mm balloon then a 3.0 x 22 mm JUDI with an excellent angiographic result; will plan a staged PCI of RCA and iFR of the CX  Complications: None   Implants:  None  Signed by:  Joel Riggins MD  4/23/2019  3:42 PM

## 2019-04-23 NOTE — Clinical Note
Lesion located in the Mid LAD. Balloon inserted. Balloon inflated using multiple inflations inflation technique. Pressure = 16 shanique; Duration = 15 sec. Inflation 2: Pressure: 15 shanique; Duration: 5 sec. Inflation 3: Pressure: 15 shanique; Duration: 5 sec.

## 2019-04-23 NOTE — PROGRESS NOTES
1600 TRANSFER - IN REPORT: 
 
Verbal report received from MIKE Pond(name) on [facility]  being received from CCL(unit) for routine progression of care Report consisted of patients Situation, Background, Assessment and  
Recommendations(SBAR). Information from the following report(s) SBAR, Kardex, Procedure Summary and Cardiac Rhythm NSR was reviewed with the receiving nurse. Opportunity for questions and clarification was provided. Assessment completed upon patients arrival to unit and care assumed. 1635 Patient arrived to floor via stretcher. Ambulated to bed in room. Tele placed and verified with monitor tech. VSS. Right wrist assessed, clean dry and intact with no bleeding and no hematoma. 1735 2cc air released from TR band. No bleeding and no hematoama. 1756 2cc air released from TR band. No bleeding and no hematoma. 1830 2cc air released from TR band. No bleeding and no hematoma. 1850 2cc air released from TR band. No bleeding and no hematoma. 16245 [facility] from [address] No bleeding and no hematoma. 1930 Bedside and Verbal shift change report given to 2018 Yuniel Thakur (oncoming nurse) by Aleksandra Mayes (offgoing nurse). Report included the following information SBAR, Kardex, MAR and Cardiac Rhythm NSR.

## 2019-04-23 NOTE — PROGRESS NOTES
TRANSFER - OUT REPORT: 
 
[facility] Mal[facility] being transferred to [place] [address] room 462 for routine progression of care Report consisted of patients Situation, Background, Assessment and  
Recommendations(SBAR). Information from the following report(s) Kardex and Procedure Summary was reviewed with the receiving nurse. Opportunity for questions and clarification was provided.

## 2019-04-23 NOTE — Clinical Note
Lesion located in the Distal RCA. Balloon inserted. Balloon inflated using multiple inflations inflation technique.

## 2019-04-23 NOTE — ED TRIAGE NOTES
Patient arrives to the ED with c/o left side chest pain x 1 week, patient describes pain as sharp and non radiating, patient reports no cardiac history, no nausea or vomiting noted, states pain becomes worse with activity.

## 2019-04-23 NOTE — ED NOTES
Gave bedside report regarding, SBAR, MAR, and plan of care to Wolfgang Colón RN. Transfered care of patient to RN.

## 2019-04-23 NOTE — Clinical Note
Lesion located in the Distal RCA. Balloon inserted. Balloon inflated using multiple inflations inflation technique. Pressure = 8 shanique; Duration = 18 sec. Inflation 2: Pressure: 12 shanique; Duration: 14 sec.

## 2019-04-23 NOTE — Clinical Note
Lesion: Located in the Mid RCA. Re-inflated stent balloon used. First inflation pressure = 14 shanique; inflation time: 7 sec.  Stent Balloon

## 2019-04-23 NOTE — H&P
[address] HISTORY AND PHYSICAL Name:  Wes Mooney 
MR#:  [id number] :  1950 ACCOUNT #:  [de-identified] ADMIT DATE:  2019 PRIMARY CARE PHYSICIAN:  Mary Ann Munoz MD 
 
SOURCE OF INFORMATION:  Patient. CHIEF COMPLAINT:  Chest pain. HISTORY OF PRESENT ILLNESS:  This is a 62-year-old man with a past medical history significant for hypothyroidism, hypertension, was in his usual state of health until about a week ago when the patient developed chest pain. This is located at the left side of the chest, intermittent. The first episode of the chest pain occurred when the patient was cutting the grass and subsided after 3 minutes of rest.  The patient has been having difficulty with sleeping because of the pain. The patient stated that he has the stress test done in . He has not seen a cardiologist.  Since then the chest pain became constant in the last 24 to 48 hours, sharp pain associated with shortness of breath. No known aggravating or relieving factors. The patient was brought to the emergency room for further evaluation. When the patient arrived at the emergency room, the first set of troponin was positive. The patient was referred to the hospitalist service for evaluation for admission. No associated fever, rigors or chills. No record of prior admission to this hospital. 
 
PAST MEDICAL HISTORY:  Hypertension, hypothyroidism. ALLERGIES:  NO KNOWN DRUG ALLERGIES. MEDICATIONS:  Synthroid 137 mcg daily in the morning, lisinopril 5 mg daily. FAMILY HISTORY: This was reviewed. His father had hypertension. PAST SURGICAL HISTORY:  This is significant for bilateral eye surgery. SOCIAL HISTORY:  The patient is a former smoker. No history of alcohol abuse. REVIEW OF SYSTEMS: 
HEAD, EYES, EARS, NOSE AND THROAT:  No headache, no dizziness, no blurring of vision, no photophobia. RESPIRATORY SYSTEM:  This is positive for shortness of breath, no cough, no hemoptysis. CARDIOVASCULAR SYSTEM:  Positive for chest pain, no orthopnea, no palpitation. GASTROINTESTINAL SYSTEM:  No nausea, vomiting or diarrhea. No constipation. GENITOURINARY SYSTEM:  No dysuria, no urgency and no frequency. All other systems are reviewed and they are negative. PHYSICAL EXAMINATION: 
GENERAL APPEARANCE:  The patient appeared ill, in moderate distress. VITAL SIGNS:  On arrival at the emergency room temperature 98.4, pulse 105, respiratory rate 16, blood pressure 158/84, oxygen saturation 95% on room air. HEAD:  Normocephalic, atraumatic. EYES:  Abnormal movement noted in the left eye. EARS:  Normal external ears with no evidence of drainage. NOSE:  No deformity, no drainage. MOUTH AND THROAT:  No visible oral lesion. NECK:  Neck is supple. No JVD, no thyromegaly. CHEST:  Clear breath sounds. No wheezing, no crackles. HEART:  Normal S1 and S2, regular. No clinically appreciable murmur. ABDOMEN:  Soft, obese, nontender, normal bowel sounds. CNS:  Alert, oriented x3. No gross focal neurological deficit. EXTREMITIES:  No edema. Pulses 2+ bilaterally. MUSCULOSKELETAL SYSTEM:  No evidence of joint deformity and swelling. SKIN:  No active skin lesions seen in the exposed part of the body. PSYCHIATRY:  Normal mood and affect. LYMPHATIC SYSTEM:  No cervical lymphadenopathy. DIAGNOSTIC DATA:  EKG shows sinus tachycardia and frequent premature ventricular complexes. Chest x-ray shows bibasilar platelike atelectasis. LABORATORY DATA:  Hematology:  WBC 7.5, hemoglobin at 16.1, hematocrit 47.2, platelets 036. Cardiac profile troponin 0.13. Chemistry:  Sodium 138, potassium 3.8, chloride 105, CO2 27, glucose 127, BUN 20, creatinine 1.58, calcium 9.1, total bilirubin 0.2, ALT 47, AST 29, alkaline phosphatase 69, total protein at 7.7, albumin level 3.4, globulin at 4.3.  
 
ASSESSMENT: 
 1.  Suspected non-ST elevation myocardial infarction. 2.  Hypertension. 3.  Hypothyroidism. 4.  Hyperglycemia. 5.  Morbid obesity. 6.  Acute kidney injury. PLAN: 
1. Suspected non-ST elevation myocardial infarction. We will admit the patient for further evaluation and treatment. We will start the patient on full-dose heparin, beta-blocker, aspirin until when the patient is seen by the cardiologist to determine whether the patient has non-ST elevation myocardial infarction or not. We will trend cardiac markers. We will obtain echocardiogram.  This is the most likely cause of the patient's chest pain. The patient will also be evaluated for other atypical causes of chest pain. We will check amylase and lipase level as well as D-dimer to evaluate the patient for thromboembolism as a possible cause of chest pain. 2.  Hypertension. We will resume preadmission medication. We will monitor the patient's blood pressure closely. 3.  Hypothyroidism. We will continue with Synthroid. We will check a TSH level. 4.  Hyperglycemia. We will check hemoglobin A1c level. The patient has no history of diabetes. 5.  Morbid obesity. Dietary consult will be requested for dietary counseling. 6.  Acute kidney injury. We will carry out fluid therapy and monitor the patient's renal function. OTHER ISSUES:  Code status, the patient is a full code. The patient is on full-dose heparin, because of that, there is no need for DVT prophylaxis. FUNCTIONAL STATUS PRIOR TO ADMISSION:  The patient is ambulatory without assistance or device. Fausto Hernandez MD 
 
 
[id number] 
D:  04/23/2019 7:25 
T:  04/23/2019 7:30 
JOB #:  [id number] CC:  Fabian Valverde MD

## 2019-04-23 NOTE — ED NOTES
Pt appears disgruntled, stating, \"I want to get out of here. I haven't seen a doctor. I haven't had breakfast. I've been here seven hours. \" Patient educated regarding current diagnosis, given medication education, reviewed that MD had been there within the last four hours. Per dietary, tray coming shortly. Patient ambulatory to the bathroom.

## 2019-04-23 NOTE — Clinical Note
Lesion: Located in the Distal RCA. Stent inserted. Stent deployed. Single technique used. First inflation pressure = 12 shanique; inflation time: 14 sec.

## 2019-04-23 NOTE — Clinical Note
Lesion located in the Mid LAD. Balloon inserted. Balloon inflated using multiple inflations inflation technique. Pressure = 13 shanique; Duration = 20 sec. Inflation 2: Pressure: 13 shanique; Duration: 8 sec.

## 2019-04-23 NOTE — Clinical Note
Lesion: Located in the Mid LAD. Stent inserted. Re-inflated stent balloon and multiple inflations used. First inflation pressure = 14 shanique; inflation time: 16 sec. Second inflation pressure: 14 shanique; inflation time: 3 sec.  SECOND INFLATION STENT BALLOON

## 2019-04-23 NOTE — PROGRESS NOTES
Pt seen and examined this AM, admitted by Dr. Geraldine Johnson early this AM 
- pt with NSTEMI to cath lab today at 1pm

## 2019-04-23 NOTE — CONSULTS
Cardiology Note dictated # [id number]  Imp:  NSTEMI: peak trop 0.20, no CP at present  CKD 3, GFR 44   Recommendations:   Needs cath today; ate breakfast; on the schedule for 1 PM today. The procedure and possible complications were reviewed with Mr Anisha Wakefield including but not limited to: CVA, MI, bleeding and vascular trauma, emergency surgery and contrast reactions. The patient agrees to proceed.   Thank you for this referral.  Kristy Kelley MD

## 2019-04-23 NOTE — ED NOTES
Received bedside report in SBAR format, updated on [facility], recent results and plan of care from [facility]. Assumed care of patient.

## 2019-04-23 NOTE — Clinical Note
Lesion: Located in the Mid RCA. Stent inserted. Stent deployed. Single technique used. First inflation pressure = 14 shanique; inflation time: 14 sec.

## 2019-04-23 NOTE — PROGRESS NOTES
TRANSFER - IN REPORT: 
 
Verbal report received from [facility] on Brandie Buckley  being received from cath lab procedure area  for routine progression of care. Report consisted of patients Situation, Background, Assessment and Recommendations(SBAR). Information from the following report(s) Kardex and Procedure Summary was reviewed with the receiving clinician. Opportunity for questions and clarification was provided. Assessment completed upon patients arrival to [address] [place] [address] and care assumed. Cardiac Cath Lab Recovery Arrival Note: 
 
Brandie Buckley arrived to [facility] recovery area. Patient procedure= LHC. Patient on cardiac monitor, non-invasive blood pressure, SPO2 monitor. On RA . IV  of NS on pump at 50 ml/hr. Patient status doing well without problems. Patient is A&Ox 3. Patient reports no pain. PROCEDURE SITE CHECK: 
 
Procedure site:without bleeding and no hematoma, No pain/discomfort reported at procedure site. No change in patient status. Continue to monitor patient and status.

## 2019-04-23 NOTE — Clinical Note
Lesion located in the Mid RCA. Balloon inserted. Balloon inflated using multiple inflations inflation technique. Pressure = 8 shanique; Duration = 11 sec. Inflation 2: Pressure: 8 shanique; Duration: 7 sec.

## 2019-04-23 NOTE — ROUTINE PROCESS
TRANSFER - OUT REPORT: 
 
Verbal report given to Dejah(name) on Luba Murrieta  being transferred to cath lab(unit) for routine progression of care Report consisted of patients Situation, Background, Assessment and  
Recommendations(SBAR). Information from the following report(s) ED Summary and Recent Results was reviewed with the receiving nurse. Lines:  
Peripheral IV 04/23/19 Right Forearm (Active) Site Assessment Clean, dry, & intact 4/23/2019  1:51 AM  
Phlebitis Assessment 0 4/23/2019  1:51 AM  
Infiltration Assessment 0 4/23/2019  1:51 AM  
Dressing Status Clean, dry, & intact 4/23/2019  1:51 AM  
   
Peripheral IV 04/23/19 Right Antecubital (Active) Site Assessment Clean, dry, & intact 4/23/2019 12:03 PM  
Phlebitis Assessment 0 4/23/2019 12:03 PM  
Infiltration Assessment 0 4/23/2019 12:03 PM  
Dressing Status Clean, dry, & intact 4/23/2019 12:03 PM  
Dressing Type Transparent 4/23/2019 12:03 PM  
Alcohol Cap Used No 4/23/2019 12:03 PM  
  
 
Opportunity for questions and clarification was provided. Patient transported with: 
 [employer]

## 2019-04-23 NOTE — PROGRESS NOTES
Admission Medication Reconciliation: 
Information obtained from:  Patient/RxQuery Comments/Recommendations: Updated PTA meds/reviewed patient's allergies. 1)  Patient states he takes some prn OTC pain medications at home, denies any medication allergies at this time. 2)  Medication changes (since last review): Added 
-Acetaminophen 500mg 1tab po prn 
-Ibuprofen 200mg 1tab po prn 
 
Prescriptions medications consistent with previously documented list. 
  
 
Allergies:  Patient has no known allergies. Significant PMH/Disease States:  
Past Medical History:  
Diagnosis Date Thyroid disease Chief Complaint for this Admission: Chief Complaint Patient presents with Chest Pain Prior to Admission Medications:  
Prior to Admission Medications Prescriptions Last Dose Informant Patient Reported? Taking?  
acetaminophen (TYLENOL) 500 mg tablet   Yes Yes Sig: Take 500 mg by mouth every six (6) hours as needed for Pain. ibuprofen (MOTRIN) 200 mg tablet   Yes Yes Sig: Take 200 mg by mouth as needed for Pain.  
levothyroxine (SYNTHROID) 137 mcg tablet   Yes Yes Sig: Take 137 mcg by mouth every morning. lisinopril (PRINIVIL, ZESTRIL) 5 mg tablet   Yes Yes Sig: Take 5 mg by mouth daily. Facility-Administered Medications: None

## 2019-04-23 NOTE — PROGRESS NOTES
Cardiac Cath Lab Recovery Arrival Note: 
 
 
Lisa Mancera arrived to Cardiac Cath Lab, Recovery Area. Staff introduced to patient. Patient identifiers verified with NAME and DATE OF BIRTH. Procedure verified with patient. Consent forms reviewed and signed by patient or authorized representative and verified. Allergies verified. Patient and family oriented to department. Patient and family informed of procedure and plan of care. Questions answered with review. Patient prepped for procedure, per orders from physician, prior to arrival. 
 
Patient on cardiac monitor, non-invasive blood pressure, SPO2 monitor. On RA. Patient is A&Ox 4. Patient reports no pain. Patient in stretcher, in low position, with side rails up, call bell within reach, patient instructed to call if assistance as needed. Patient prep in: [address], [place] [address]. Patient family has pager # Family in: . Prep by: JR Arias RN

## 2019-04-23 NOTE — ED PROVIDER NOTES
79 y.o. male with past medical history significant for Thyroid disease, HTN who presents from home with chief complaint of chest pain. Pt states over the past week he has had intermittent episodes of sharp, left to mid-sternal chest pain with associated shortness of breath. He notes the first episode onset while he was cutting the grass and subsided after 3-4 with resting. His wife at bedside states over the past 3 days he has been taking Tums and Tylenol without any relief. She  Also notes over the past 3 nights the pt has been unable to lay flat secondary to the symptoms, keeping him from being able to sleep at night. Pt notes his last stress test was in the 1990s and he does not follow with a cardiologist. He denies any current chest pain. Pt specifically denies any fever, chills, cough, congestion, diaphoresis, abdominal pain, nausea, vomiting, diarrhea, dysuria, or urinary frequency. There are no other acute medical concerns at this time. PSHx: Significant for colonoscopy Social Hx: former tobacco use, negative EtOH use, negative Illicit Drug use PCP: Yu Gannon MD 
 
Note written by Aide Zamora, as dictated by Yvette Judd MD 1:38 AM 
 
The history is provided by the patient. No  was used. Past Medical History:  
Diagnosis Date  Thyroid disease Past Surgical History:  
Procedure Laterality Date  COLONOSCOPY N/A 1/7/2019 COLONOSCOPY performed by Lucrecia Stinson MD at [facility] ENDOSCOPY  
 HX HEENT    
 bilateral eye operations done about 50 years ago History reviewed. No pertinent family history. Social History Socioeconomic History  Marital status:  Spouse name: Not on file  Number of children: Not on file  Years of education: Not on file  Highest education level: Not on file Occupational History  Not on file Social Needs  Financial resource strain: Not on file  Food insecurity:  
  Worry: Not on file Inability: Not on file  Transportation needs:  
  Medical: Not on file Non-medical: Not on file Tobacco Use  Smoking status: Former Smoker Substance and Sexual Activity  Alcohol use: Not Currently  Drug use: Never  Sexual activity: Not on file Lifestyle  Physical activity:  
  Days per week: Not on file Minutes per session: Not on file  Stress: Not on file Relationships  Social connections:  
  Talks on phone: Not on file Gets together: Not on file Attends Latter-day service: Not on file Active member of club or organization: Not on file Attends meetings of clubs or organizations: Not on file Relationship status: Not on file  Intimate partner violence:  
  Fear of current or ex partner: Not on file Emotionally abused: Not on file Physically abused: Not on file Forced sexual activity: Not on file Other Topics Concern  Not on file Social History Narrative  Not on file ALLERGIES: Patient has no known allergies. Review of Systems Constitutional: Negative for appetite change and fever. HENT: Negative for congestion, nosebleeds and sore throat. Eyes: Negative for discharge. Respiratory: Positive for shortness of breath. Negative for cough. Cardiovascular: Positive for chest pain. Gastrointestinal: Negative for abdominal pain, diarrhea, nausea and vomiting. Genitourinary: Negative for dysuria. Musculoskeletal: Negative. Skin: Negative for rash. Neurological: Negative for weakness and headaches. Hematological: Negative for adenopathy. Psychiatric/Behavioral: Positive for sleep disturbance. All other systems reviewed and are negative. Vitals:  
 04/23/19 0128 BP: 158/84 Pulse: (!) 105 Resp: 16 Temp: 98.4 °F (36.9 °C) SpO2: 95% Weight: 117.6 kg (259 lb 4.2 oz) Height: 5' 8\" (1.727 m) Physical Exam  
 Constitutional: He is oriented to person, place, and time. He appears well-developed and well-nourished. HENT:  
Head: Normocephalic and atraumatic. Mouth/Throat: Oropharynx is clear and moist.  
Eyes: Conjunctivae are normal.  
Neck: Normal range of motion. Neck supple. Cardiovascular: Normal rate, regular rhythm and normal heart sounds. Pulmonary/Chest: Effort normal and breath sounds normal.  
Abdominal: Soft. Bowel sounds are normal. There is no tenderness. Musculoskeletal: Normal range of motion. He exhibits no edema or tenderness. Trace BLE edema Neurological: He is alert and oriented to person, place, and time. Skin: Skin is warm and dry. Psychiatric: He has a normal mood and affect. His behavior is normal.  
Nursing note and vitals reviewed. Note written by Aide Burnette, as dictated by Von Cast MD 1:38 AM 
 
MDM Procedures ED EKG interpretation: 
Rhythm: sinus tachycardia; and regular . Rate (approx.): 105; Axis: normal; P wave: normal; QRS interval: normal ; ST/T wave: non-specific changes;EKG documented by Viv Rivera MD, scribe, as interpreted by Von Cast MD, ED MD. Hospitalist Madeline for Admission 3:07 AM - Dr. Junior Romero 
 
ED Room Number: ER09/09 Patient Name and age:  Shree Shirley 79 y.o.  male Working Diagnosis: 1. NSTEMI (non-ST elevated myocardial infarction) ([facility] [address] Readmission: no 
Isolation Requirements:  no 
Recommended Level of Care:  telemetry Code Status:  Full Other:  Family requests Dr. Elizabeth Olea A/P: 
1. Non-STEMI - aspirin given. No pain in ED. Admit

## 2019-04-23 NOTE — PROGRESS NOTES
Cardiac Cath Lab Procedure Area Arrival Note: 
 
[place] arrived to Cardiac Cath Lab, Procedure Area. Patient identifiers verified with NAME and DATE OF BIRTH. Procedure verified with patient. Consent forms verified. Allergies verified. Patient informed of procedure and plan of care. Questions answered with review. Patient voiced understanding of procedure and plan of care. Patient on cardiac monitor, non-invasive blood pressure, SPO2 monitor. On RA and placed on O2 @ 2 lpm via NC.  IV of NSS on pump at 570 ml/hr per BANG protocol. Patient status doing well without problems. Patient is A&Ox 4. Patient reports right mid quadrant abdominal cramping rated at 5/10. Patient medicated during procedure with orders obtained and verified by Dr. Ira Grewal. Refer to patients Cardiac Cath Lab PROCEDURE REPORT for vital signs, assessment, status, and response during procedure, printed at end of case. Printed report on chart or scanned into chart. 1508 Transfer to [address] [place] [address] from Procedure Area Verbal report given to HAYLEY De Jesus on [place] being transferred to Cardiac Cath Lab  for routine progression of care   Patient is post [facility] and PCI of LAD procedure. Patient stable upon transfer to . Report consisted of patients Situation, Background, Assessment and  
Recommendations(SBAR). Information from the following report(s) SBAR, Procedure Summary and MAR was reviewed with the receiving nurse. Opportunity for questions and clarification was provided. Patient medicated during procedure with orders obtained and verified by Dr. Ira Grewal. Refer to patient PROCEDURE REPORT for vital signs, assessment, status, and response during procedure.

## 2019-04-24 ENCOUNTER — HOME HEALTH ADMISSION (OUTPATIENT)
Dept: HOME HEALTH SERVICES | Facility: HOME HEALTH | Age: 69
End: 2019-04-24

## 2019-04-24 LAB
ALBUMIN SERPL-MCNC: 3.5 G/DL (ref 3.5–5)
ALBUMIN/GLOB SERPL: 0.9 {RATIO} (ref 1.1–2.2)
ALP SERPL-CCNC: 53 U/L (ref 45–117)
ALT SERPL-CCNC: 37 U/L (ref 12–78)
ANION GAP SERPL CALC-SCNC: 5 MMOL/L (ref 5–15)
AST SERPL-CCNC: 39 U/L (ref 15–37)
ATRIAL RATE: 70 BPM
ATRIAL RATE: 76 BPM
BILIRUB SERPL-MCNC: 0.5 MG/DL (ref 0.2–1)
BUN SERPL-MCNC: 22 MG/DL (ref 6–20)
BUN/CREAT SERPL: 16 (ref 12–20)
CALCIUM SERPL-MCNC: 9.2 MG/DL (ref 8.5–10.1)
CALCULATED P AXIS, ECG09: 62 DEGREES
CALCULATED R AXIS, ECG10: 37 DEGREES
CALCULATED R AXIS, ECG10: 44 DEGREES
CALCULATED T AXIS, ECG11: 156 DEGREES
CALCULATED T AXIS, ECG11: 68 DEGREES
CHLORIDE SERPL-SCNC: 104 MMOL/L (ref 97–108)
CHOLEST SERPL-MCNC: 192 MG/DL
CO2 SERPL-SCNC: 27 MMOL/L (ref 21–32)
CRD SYSTOLIC BP: 124
CREAT SERPL-MCNC: 1.36 MG/DL (ref 0.7–1.3)
DIAGNOSIS, 93000: NORMAL
DIAGNOSIS, 93000: NORMAL
END DIASTOLIC PRESSURE: 6
GLOBULIN SER CALC-MCNC: 4 G/DL (ref 2–4)
GLUCOSE SERPL-MCNC: 102 MG/DL (ref 65–100)
HDLC SERPL-MCNC: 35 MG/DL
HDLC SERPL: 5.5 {RATIO} (ref 0–5)
LDLC SERPL CALC-MCNC: 102 MG/DL (ref 0–100)
LIPID PROFILE,FLP: ABNORMAL
P-R INTERVAL, ECG05: 170 MS
P-R INTERVAL, ECG05: 182 MS
POTASSIUM SERPL-SCNC: 4.4 MMOL/L (ref 3.5–5.1)
PROT SERPL-MCNC: 7.5 G/DL (ref 6.4–8.2)
Q-T INTERVAL, ECG07: 394 MS
Q-T INTERVAL, ECG07: 428 MS
QRS DURATION, ECG06: 74 MS
QRS DURATION, ECG06: 78 MS
QTC CALCULATION (BEZET), ECG08: 443 MS
QTC CALCULATION (BEZET), ECG08: 462 MS
SODIUM SERPL-SCNC: 136 MMOL/L (ref 136–145)
TRIGL SERPL-MCNC: 275 MG/DL (ref ?–150)
VENTRICULAR RATE, ECG03: 70 BPM
VENTRICULAR RATE, ECG03: 76 BPM
VLDLC SERPL CALC-MCNC: 55 MG/DL

## 2019-04-24 PROCEDURE — C1769 GUIDE WIRE: HCPCS | Performed by: INTERNAL MEDICINE

## 2019-04-24 PROCEDURE — C1760 CLOSURE DEV, VASC: HCPCS | Performed by: INTERNAL MEDICINE

## 2019-04-24 PROCEDURE — 93005 ELECTROCARDIOGRAM TRACING: CPT

## 2019-04-24 PROCEDURE — 80053 COMPREHEN METABOLIC PANEL: CPT

## 2019-04-24 PROCEDURE — 74011250637 HC RX REV CODE- 250/637: Performed by: INTERNAL MEDICINE

## 2019-04-24 PROCEDURE — 92928 PRQ TCAT PLMT NTRAC ST 1 LES: CPT | Performed by: INTERNAL MEDICINE

## 2019-04-24 PROCEDURE — C1887 CATHETER, GUIDING: HCPCS | Performed by: INTERNAL MEDICINE

## 2019-04-24 PROCEDURE — 74011000250 HC RX REV CODE- 250: Performed by: INTERNAL MEDICINE

## 2019-04-24 PROCEDURE — 77030013715 HC INFL SYS MRTM -B: Performed by: INTERNAL MEDICINE

## 2019-04-24 PROCEDURE — 74011250636 HC RX REV CODE- 250/636: Performed by: INTERNAL MEDICINE

## 2019-04-24 PROCEDURE — 74011250636 HC RX REV CODE- 250/636

## 2019-04-24 PROCEDURE — 36415 COLL VENOUS BLD VENIPUNCTURE: CPT

## 2019-04-24 PROCEDURE — 74011636320 HC RX REV CODE- 636/320: Performed by: INTERNAL MEDICINE

## 2019-04-24 PROCEDURE — 77030012468 HC VLV BLEEDBK CNTRL ABBT -B: Performed by: INTERNAL MEDICINE

## 2019-04-24 PROCEDURE — C1874 STENT, COATED/COV W/DEL SYS: HCPCS | Performed by: INTERNAL MEDICINE

## 2019-04-24 PROCEDURE — 80061 LIPID PANEL: CPT

## 2019-04-24 PROCEDURE — 4A033BC MEASUREMENT OF ARTERIAL PRESSURE, CORONARY, PERCUTANEOUS APPROACH: ICD-10-PCS | Performed by: INTERNAL MEDICINE

## 2019-04-24 PROCEDURE — 99153 MOD SED SAME PHYS/QHP EA: CPT | Performed by: INTERNAL MEDICINE

## 2019-04-24 PROCEDURE — C1894 INTRO/SHEATH, NON-LASER: HCPCS | Performed by: INTERNAL MEDICINE

## 2019-04-24 PROCEDURE — C1725 CATH, TRANSLUMIN NON-LASER: HCPCS | Performed by: INTERNAL MEDICINE

## 2019-04-24 PROCEDURE — 027035Z DILATION OF CORONARY ARTERY, ONE ARTERY WITH TWO DRUG-ELUTING INTRALUMINAL DEVICES, PERCUTANEOUS APPROACH: ICD-10-PCS | Performed by: INTERNAL MEDICINE

## 2019-04-24 PROCEDURE — 65660000000 HC RM CCU STEPDOWN

## 2019-04-24 PROCEDURE — 93571 IV DOP VEL&/PRESS C FLO 1ST: CPT | Performed by: INTERNAL MEDICINE

## 2019-04-24 PROCEDURE — 99152 MOD SED SAME PHYS/QHP 5/>YRS: CPT | Performed by: INTERNAL MEDICINE

## 2019-04-24 PROCEDURE — 93454 CORONARY ARTERY ANGIO S&I: CPT | Performed by: INTERNAL MEDICINE

## 2019-04-24 PROCEDURE — 74011000258 HC RX REV CODE- 258: Performed by: INTERNAL MEDICINE

## 2019-04-24 PROCEDURE — 77030013744: Performed by: INTERNAL MEDICINE

## 2019-04-24 PROCEDURE — 77030004533 HC CATH ANGI DX IMP BSC -B: Performed by: INTERNAL MEDICINE

## 2019-04-24 DEVICE — STENT RONYX30015W RESOLUTE ONYX 3.00X15
Type: IMPLANTABLE DEVICE | Status: FUNCTIONAL
Brand: RESOLUTE ONYX™

## 2019-04-24 DEVICE — STENT RONYX22518W RESOLUTE ONYX 2.25X18
Type: IMPLANTABLE DEVICE | Status: FUNCTIONAL
Brand: RESOLUTE ONYX™

## 2019-04-24 RX ORDER — MIDAZOLAM HYDROCHLORIDE 1 MG/ML
INJECTION, SOLUTION INTRAMUSCULAR; INTRAVENOUS AS NEEDED
Status: DISCONTINUED | OUTPATIENT
Start: 2019-04-24 | End: 2019-04-24 | Stop reason: HOSPADM

## 2019-04-24 RX ORDER — SODIUM CHLORIDE 9 MG/ML
3 INJECTION, SOLUTION INTRAVENOUS CONTINUOUS
Status: DISPENSED | OUTPATIENT
Start: 2019-04-24 | End: 2019-04-24

## 2019-04-24 RX ORDER — ACETAMINOPHEN 500 MG
500 TABLET ORAL
Status: CANCELLED | OUTPATIENT
Start: 2019-04-24

## 2019-04-24 RX ORDER — FENTANYL CITRATE 50 UG/ML
INJECTION, SOLUTION INTRAMUSCULAR; INTRAVENOUS AS NEEDED
Status: DISCONTINUED | OUTPATIENT
Start: 2019-04-24 | End: 2019-04-24 | Stop reason: HOSPADM

## 2019-04-24 RX ORDER — HEPARIN SODIUM 200 [USP'U]/100ML
INJECTION, SOLUTION INTRAVENOUS
Status: COMPLETED | OUTPATIENT
Start: 2019-04-24 | End: 2019-04-24

## 2019-04-24 RX ORDER — CLOPIDOGREL 300 MG/1
TABLET, FILM COATED ORAL AS NEEDED
Status: DISCONTINUED | OUTPATIENT
Start: 2019-04-24 | End: 2019-04-24 | Stop reason: HOSPADM

## 2019-04-24 RX ORDER — LIDOCAINE HYDROCHLORIDE 10 MG/ML
INJECTION INFILTRATION; PERINEURAL AS NEEDED
Status: DISCONTINUED | OUTPATIENT
Start: 2019-04-24 | End: 2019-04-24 | Stop reason: HOSPADM

## 2019-04-24 RX ORDER — SODIUM CHLORIDE 9 MG/ML
1.5 INJECTION, SOLUTION INTRAVENOUS CONTINUOUS
Status: DISPENSED | OUTPATIENT
Start: 2019-04-24 | End: 2019-04-24

## 2019-04-24 RX ADMIN — CLOPIDOGREL BISULFATE 75 MG: 75 TABLET ORAL at 09:10

## 2019-04-24 RX ADMIN — Medication 10 ML: at 14:52

## 2019-04-24 RX ADMIN — LISINOPRIL 5 MG: 5 TABLET ORAL at 09:09

## 2019-04-24 RX ADMIN — METOPROLOL TARTRATE 12.5 MG: 25 TABLET ORAL at 09:10

## 2019-04-24 RX ADMIN — LEVOTHYROXINE SODIUM 137 MCG: 0.11 TABLET ORAL at 06:40

## 2019-04-24 RX ADMIN — Medication 10 ML: at 06:40

## 2019-04-24 RX ADMIN — METOPROLOL TARTRATE 12.5 MG: 25 TABLET ORAL at 23:34

## 2019-04-24 RX ADMIN — Medication 10 ML: at 23:34

## 2019-04-24 RX ADMIN — ASPIRIN 81 MG CHEWABLE TABLET 81 MG: 81 TABLET CHEWABLE at 09:20

## 2019-04-24 RX ADMIN — SODIUM CHLORIDE 1.5 ML/KG/HR: 900 INJECTION, SOLUTION INTRAVENOUS at 09:45

## 2019-04-24 RX ADMIN — SODIUM CHLORIDE 3 ML/KG/HR: 900 INJECTION, SOLUTION INTRAVENOUS at 08:44

## 2019-04-24 NOTE — PROGRESS NOTES
The CM spoke with Mariana Taylor,  with [facility] ([phone]) and she confirmed the patient is a [facility] Patient- formal assessment to follow.  FRENCH Oliveira

## 2019-04-24 NOTE — CARDIO/PULMONARY
Cardiac Rehab: MI education folder, with catheterization brochure, to bedside of Moy Paige. Met with the pt., his wife and daughter to provide education. Educated using teach back method. Reviewed MI diagnosis definition and purpose of intervention. Discussed risk factors for CAD to include the following: family history, elevated BMI, hyperlipidemia, hypertension, diabetes, stress, and smoking. Discussed Heart Healthy/Low Sodium (2000 mg) diet. Reviewed the importance of medication compliance, the purpose of aspirin, and potential side effects. Discussed follow up appointments with cardiologist, signs and symptoms of angina, and what to report to physician after discharge. Emphasized the value of cardiac rehab. Discussed Cardiac Rehab Program format, benefits, and encouraged enrollment to assist with risk modification and management. He would like insurance benefit first so will follow up by phone. Karmen Paige verbalized understanding with questions answered.   Lary Rainey RN

## 2019-04-24 NOTE — PROCEDURES
Cardiac Catheterization Procedure Note   Patient: Ernesto Jeans  MRN: [id number]  SSN: [id number]   YOB: 1950 Age: 79 y.o.   Sex: male    Date of Procedure: 4/24/2019   Pre-procedure Diagnosis: Coronary Artery Disease, NSTEMI and planned staged PCI  Post-procedure Diagnosis: Coronary Artery Disease  Procedure: Left Heart Cath, PCI to RCA and iFR of CX  :  Dr. Devan Gann MD    Assistant(s):  None  Anesthesia: Moderate Sedation   Estimated Blood Loss: Less than 10 mL   Specimens Removed: None  Findings: 80% tubular mid-distal  RCA stenosis, 95% distal RCA treated with 3.0 x 15 and 2.25 x 18 respectively; iFR of mid-distal CX stenosis was nonsignificant  Complications: None   Implants:  None  Signed by:  Devan Gann MD  4/24/2019  12:16 PM

## 2019-04-24 NOTE — PROGRESS NOTES
TRANSFER - OUT REPORT: 
 
Verbal report given to [address] RN on eBcky Perez being transferred to Room 462 for routine progression of care Report consisted of patients Situation, Background, Assessment and  
Recommendations(SBAR). Information from the following report(s) SBAR, Procedure Summary, MAR, Recent Results and Cardiac Rhythm NSR was reviewed with the receiving nurse. Opportunity for questions and clarification was provided.

## 2019-04-24 NOTE — PROGRESS NOTES
Hospitalist Progress Note Jackie Bolanos MD 
Answering service: [phone] OR [phone] from in house phone Date of Service:  2019 NAME:  Adam Roasdo :  1950 MRN:  [id number] Admission Summary: This is a 82-year-old man with a past medical history significant for hypothyroidism, hypertension, was in his usual state of health until about a week ago when the patient developed chest pain. Interval history / Subjective:  
Back to cath lab today for staged PCI No complaints today, denies any chest pain. Assessment & Plan:  
 
Chest pain secondary to NSTEMI - peak trop 0.2, s/p PCI to LAD on , then PCI to RCA  
- Contineu to monitor overnight - Cardiology following - On ASA, plavix, statin, BB, ACEi 
- Will need o/p follow up  \ 
 
HTN - on ACE, coreg, continue to monitor Obesity - BMI 39, counseled regarding weight loss Hypothyroid - levothyroxine DAVID - Cr up to 1.58 on admission, improving, possibly will get worse after contrast load, monitor - I and O, avoid nephrotoxins, continue to monitor. Code status: FULL 
DVT prophylaxis: SCD Care Plan discussed with: Patient/Family Disposition: TBD Hospital Problems  Date Reviewed: 2019 Codes Class Noted POA * (Principal) NSTEMI (non-ST elevated myocardial infarction) ([facility] [address] ICD-10-CM: I21.4 ICD-9-CM: 410.70  2019 Yes Review of Systems: A comprehensive review of systems was negative except for that written in the HPI. Vital Signs:  
 Last 24hrs VS reviewed since prior progress note. Most recent are: 
Visit Vitals /81 (BP 1 Location: Left arm, BP Patient Position: At rest) Pulse 74 Temp 97.8 °F (36.6 °C) Resp 16 Ht 5' 8\" (1.727 m) Wt 118.5 kg (261 lb 3.9 oz) SpO2 95% BMI 39.72 kg/m² Intake/Output Summary (Last 24 hours) at 2019 1644 Last data filed at 4/24/2019 8492 Gross per 24 hour Intake 480 ml Output  Net 480 ml Physical Examination:  
 
 
     
Constitutional:  No acute distress, cooperative, pleasant   
ENT:  Oral mucous moist, oropharynx benign. Neck supple, Resp:  CTA bilaterally. No wheezing/rhonchi/rales. No accessory muscle use CV:  Regular rhythm, normal rate, no murmurs, gallops, rubs GI:  Soft, non distended, non tender. normoactive bowel sounds, no hepatosplenomegaly Musculoskeletal:  No edema, warm, 2+ pulses throughout, cath sites c/d/i Neurologic:  Moves all extremities. AAOx3, CN II-XII reviewed Skin:  Good turgor, no rashes or ulcers Data Review:  
 Imaging and laboratory data reviewed. Labs:  
 
Recent Labs  
  04/23/19 0153 WBC 7.5 HGB 16.1 HCT 47.2  Recent Labs  
  04/24/19 0402 04/23/19 0544 04/23/19 0153   --  138  
K 4.4  --  3.8   --  105 CO2 27  --  27 BUN 22*  --  20  
CREA 1.36*  --  1.58* *  --  127* CA 9.2  --  9.1 MG  --  2.3  --   
PHOS  --  2.5*  --   
 
Recent Labs  
  04/24/19 0402 04/23/19 0544 04/23/19 0153 SGOT 39*  --  29 ALT 37  --  47  
AP 53  --  69  
TBILI 0.5  --  0.2 TP 7.5  --  7.7 ALB 3.5  --  3.4*  
GLOB 4.0  --  4.3* AML  --  23*  --   
LPSE  --  52*  --   
 
Recent Labs  
  04/23/19 
1001 04/23/19 0153 INR  --  1.0 PTP  --  9.9 APTT 29.9 27.4 No results for input(s): FE, TIBC, PSAT, FERR in the last 72 hours. No results found for: FOL, RBCF No results for input(s): PH, PCO2, PO2 in the last 72 hours. Recent Labs  
  04/23/19 0544 04/23/19 0153   --   
CKNDX 2.1  --   
TROIQ 0.20* 0.13* Lab Results Component Value Date/Time  Cholesterol, total 192 04/24/2019 04:02 AM  
 HDL Cholesterol 35 04/24/2019 04:02 AM  
 LDL, calculated 102 (H) 04/24/2019 04:02 AM  
 Triglyceride 275 (H) 04/24/2019 04:02 AM  
 CHOL/HDL Ratio 5.5 (H) 04/24/2019 04:02 AM  
 No results found for: Maday Amador No results found for: COLOR, APPRN, SPGRU, REFSG, VIKY, PROTU, GLUCU, KETU, BILU, UROU, KATIE, LEUKU, GLUKE, EPSU, BACTU, WBCU, RBCU, CASTS, UCRY Medications Reviewed:  
 
Current Facility-Administered Medications Medication Dose Route Frequency  acetaminophen (TYLENOL) tablet 650 mg  650 mg Oral Q4H PRN  
 nitroglycerin (NITROSTAT) tablet 0.4 mg  0.4 mg SubLINGual Q5MIN PRN  
 metoprolol tartrate (LOPRESSOR) tablet 12.5 mg  12.5 mg Oral Q12H  
 morphine injection 2 mg  2 mg IntraVENous Q4H PRN  
 lisinopril (PRINIVIL, ZESTRIL) tablet 5 mg  5 mg Oral DAILY  sodium chloride (NS) flush 5-40 mL  5-40 mL IntraVENous Q8H  
 sodium chloride (NS) flush 5-40 mL  5-40 mL IntraVENous PRN  
 ondansetron (ZOFRAN) injection 4 mg  4 mg IntraVENous Q4H PRN  
 bisacodyl (DULCOLAX) tablet 5 mg  5 mg Oral DAILY PRN  
 lactated Ringers infusion  75 mL/hr IntraVENous CONTINUOUS  
 levothyroxine (SYNTHROID) tablet 137 mcg  137 mcg Oral 7am  
 aspirin chewable tablet 81 mg  81 mg Oral DAILY  clopidogrel (PLAVIX) tablet 75 mg  75 mg Oral DAILY  
 
______________________________________________________________________ EXPECTED LENGTH OF STAY: - - - 
ACTUAL LENGTH OF STAY:          1 
 
            
Stephen Damon MD

## 2019-04-24 NOTE — PROGRESS NOTES
0800: Bedside shift change report given to [address] [place] [address] (oncoming nurse) by Cristofer Justice (offgoing nurse). Report included the following information SBAR, Kardex, Intake/Output, MAR and Recent Results. Problem: Cath Lab Procedures: Post-Cath Day of Procedure (Initiate SCIP Measures for Post-Op Care) Goal: Activity/Safety Outcome: Progressing Towards Goal 
Goal: Nutrition/Diet Outcome: Progressing Towards Goal 
Goal: Respiratory Outcome: Progressing Towards Goal 
  
Problem: Pressure Injury - Risk of 
Goal: *Prevention of pressure injury Description Document Floyd Scale and appropriate interventions in the flowsheet. Outcome: Progressing Towards Goal 
  
Problem: Falls - Risk of 
Goal: *Absence of Falls Description Document Benny Quiles Fall Risk and appropriate interventions in the flowsheet.  
Outcome: Progressing Towards Goal

## 2019-04-24 NOTE — PROGRESS NOTES
Cardiac Cath Lab Procedure Area Arrival Note: 
 
Addis Beltran arrived to Cardiac Cath Lab, Procedure Area. Patient identifiers verified with NAME and DATE OF BIRTH. Procedure verified with patient. Consent forms verified. Allergies verified. Patient informed of procedure and plan of care. Questions answered with review. Patient voiced understanding of procedure and plan of care. Patient on cardiac monitor, non-invasive blood pressure, SPO2 monitor. On room air then placed on O2 @ 2 lpm via NC.  IV of normal saline on pump at 178 ml/hr. Patient status doing well without problems. Patient is A&Ox 4. Patient reports no pain. Patient medicated during procedure with orders obtained and verified by Dr. Thony Leung. Refer to patients Cardiac Cath Lab PROCEDURE REPORT for vital signs, assessment, status, and response during procedure, printed at end of case. Printed report on chart or scanned into chart.

## 2019-04-24 NOTE — CONSULTS
[address]    Name:  Caitlin Donahue  MR#:  [id number]  :  1950  ACCOUNT #:  [de-identified]  DATE OF SERVICE:  2019      REFERRING PHYSICIAN:  Dr. Pritesh Guerra. HISTORY OF PRESENT ILLNESS:  The patient came to the emergency room 1 o'clock this morning with left upper chest area pain. He has been having this pain intermittently since last week when he was mowing grass. It is reliably occurring with exertion. He has associated shortness of breath. He denies any diaphoresis, syncope, palpitations, or orthopnea. The patient has only recently been seeing a physician on a regular basis and does note that he has some renal insufficiency. He does not have an understanding of what the reason for that might be. He has not been treated for hypertension or diabetes. There is no prior history for heart disease, and the patient denies stroke or rheumatic fever. MEDICATIONS:  As listed in the chart at home prior to admission are as follows:  Levothyroxine, Tylenol, ibuprofen, and lisinopril 5 mg daily. ALLERGIES:  NO KNOWN ALLERGIES. The patient stopped smoking 5 years ago. The patient denies any abdominal pain, back pain, any bleeding per urine or stool, or any hemoptysis. He does have some wheezing and coughing. No fever. No recent operations. PHYSICAL EXAMINATION:  GENERAL:  On exam, this is a well-developed, pleasant, middle-aged male, aged 79. VITAL SIGNS:  As follows:  Heart rate is 92, blood pressure 146/89, sats 99%, respirations 21. He is afebrile. HEENT:  Unremarkable. NECK:  Supple. No adenopathy. No bruits on auscultation. Normal thyroid. Trachea midline. CHEST:  Chest wall is nontender. LUNGS:  Scattered rhonchi. No rales or wheezing. HEART:  Regular, moderate rhythm. Prominent S4. No S3. No friction rub. No neck vein distention. No hepatojugular reflux. No thrills, lifts, or heaves. ABDOMEN:  Obese and nontender. No bruits. No ascites. No palpable masses. Normal pedal pulses. Normal radial pulses bilaterally. NEUROLOGIC:  The patient is awake, alert, and appropriate. No focal motor signs. Normal speech. Normal gait. His EKG demonstrates sinus tachycardia with PVCs. LABORATORY DATA:  Hemogram is normal.  Chemistries:  Creatinine is 1.58, BUN 20, troponin 0.13/0.20, NT-proBNP 298. Chest x-ray demonstrates some mild atelectasis, otherwise, unremarkable. IMPRESSION:  The patient has had a non-ST-elevation myocardial infarction, classic angina with exertion. He is chest pain free at this time. He did eat breakfast.  He is on a heparin drip. I discussed this situation with the patient, and he agrees to proceed with coronary angiography with possible PCI later on this afternoon. He is on the schedule at 4 o'clock. The procedure was explained in detail. The risks of the procedure were explained in detail including, but not limited to, stroke, myocardial infarction, bleeding and vascular trauma, emergency surgery, and contrast reactions including contrast induced nephropathy. He will be placed on the BANG protocol.     Thank you for this referral.      Esdras Doan MD      [id number]  D:  04/23/2019 10:24  T:  04/23/2019 12:22  JOB #:  [id number]

## 2019-04-24 NOTE — PROGRESS NOTES
NUTRITION brief Automatic MD consult for wt loss education received. NSTEMI this admit noted. Seen by cardiac wellness with diet education provided at bedside with pt and wife. Plans for cardiac rehab after discharge noted. Will continue to follow for further cardiac diet education needs PRN while inpatient. Once over acute medical condition consider referral to outpatient RD for wt loss education.  
 
Augustin Peña RD

## 2019-04-24 NOTE — PROGRESS NOTES
TRANSFER - OUT REPORT: 
 
Verbal report given to Sara Stearns (name) on Wing Peters  being transferred to cath lab recovery room (unit) for routine progression of care Report consisted of patients Situation, Background, Assessment and  
Recommendations(SBAR). Information from the following report(s) SBAR, Procedure Summary and MAR was reviewed with the receiving nurse. Lines:  
Peripheral IV 04/23/19 Right Forearm (Active) Site Assessment Clean, dry, & intact 4/24/2019  8:16 AM  
Phlebitis Assessment 0 4/24/2019  8:16 AM  
Infiltration Assessment 0 4/24/2019  8:16 AM  
Dressing Status Clean, dry, & intact 4/24/2019  8:16 AM  
Dressing Type Transparent;Tape 4/24/2019  8:16 AM  
Hub Color/Line Status Pink;Capped 4/24/2019  8:16 AM  
Action Taken Open ports on tubing capped 4/24/2019  8:16 AM  
Alcohol Cap Used Yes 4/24/2019  8:16 AM  
   
Peripheral IV 04/23/19 Right Antecubital (Active) Site Assessment Clean, dry, & intact 4/24/2019  8:16 AM  
Phlebitis Assessment 0 4/24/2019  8:16 AM  
Infiltration Assessment 0 4/24/2019  8:16 AM  
Dressing Status Clean, dry, & intact 4/24/2019  8:16 AM  
Dressing Type Transparent;Tape 4/24/2019  8:16 AM  
Hub Color/Line Status Pink;Capped 4/24/2019  8:16 AM  
Action Taken Open ports on tubing capped 4/24/2019  8:16 AM  
Alcohol Cap Used Yes 4/24/2019  8:16 AM  
  
 
Opportunity for questions and clarification was provided. Patient transported with: 
 Delivery Agent

## 2019-04-24 NOTE — PROGRESS NOTES
Reason for Admission:  Patient presented with NSTEMI   
                
RRAT Score:   8 Plan for utilizing home health:  CM discussed H2H- patient is agreeable for [facility] visit, CM sent referral to [facility] via cclink for [facility] for NSTEMI Current Advanced Directive/Advance Care Plan: Full code, none on file Likelihood of Readmission:  Moderate Transition of Care Plan: Home with [facility] visit The CM met with the patient, spouse, and daughter at bedside in order to introduce the role of CM and assess for patient needs. The patient lives at home with his wife- verified demographics and insurance information. The patient is a [facility] patient. The patient endorsed being independent with ADLs and mobility prior to hospitalization, denied use of DME in the home- patient still driving. The patient denied difficulty affording or accessing medications prior to hospitalization. The CM discussed H2H- patient and family agreeable, requested [facility] for NSTEMI orders. CM sent referral via cclink. CM will continue to follow for transitions of care, Cyndi Romano,  with [facility], notified of patient's admission. FRENCH Walker Care Management Interventions PCP Verified by CM: Yes(Patient verified PCP as Dr. Tulio Werner ) Mode of Transport at Discharge: Other (see comment)(Family will transport home upon discharge) Transition of Care Consult (CM Consult): Discharge Planning MyChart Signup: No 
Discharge Durable Medical Equipment: No 
Health Maintenance Reviewed: Yes Physical Therapy Consult: No 
Occupational Therapy Consult: No 
Speech Therapy Consult: No 
Current Support Network: Lives with Spouse, Own Home, Family Lives [place] Confirm Follow Up Transport: Family Plan discussed with Pt/Family/Caregiver: Yes [employer] Information Provided?: No 
Discharge Location Discharge Placement: Home

## 2019-04-24 NOTE — PROGRESS NOTES
0730:  Bedside shift change report given to Sathish Torres (oncoming nurse) by Clyde Reza (offgoing nurse). Report included the following information SBAR, Kardex, MAR and Recent Results. 0820:  Pt off unit to cath lab 
 
1405:  Pt back from cath lab. 
 
1930:  Bedside shift change report given to Asaf Conde (oncoming nurse) by Sathish Torres (offgoing nurse). Report included the following information SBAR, Kardex, MAR and Recent Results. Problem: Pressure Injury - Risk of 
Goal: *Prevention of pressure injury Description Document Floyd Scale and appropriate interventions in the flowsheet. Outcome: Progressing Towards Goal 
Note:  
Pt repositions self frequently Problem: Patient Education: Go to Patient Education Activity Goal: Patient/Family Education Outcome: Progressing Towards Goal 
  
Problem: Falls - Risk of 
Goal: *Absence of Falls Description Document Jordi Rodriguez Fall Risk and appropriate interventions in the flowsheet. Outcome: Progressing Towards Goal 
Note:  
Pt up ad du and remains free of falls

## 2019-04-24 NOTE — PROGRESS NOTES
TRANSFER - IN REPORT: 
 
Verbal report received from [facility] on Matt Helms  being received from procedure area for routine progression of care. Report consisted of patients Situation, Background, Assessment and Recommendations(SBAR). Information from the following report(s) SBAR, Procedure Summary, MAR, Recent Results and Cardiac Rhythm NSR was reviewed with the receiving clinician. Opportunity for questions and clarification was provided. Assessment completed upon patients arrival to [address] [place] [address] and care assumed. Cardiac Cath Lab Recovery Arrival Note: 
 
Matt Helms arrived to [facility] recovery area. Patient procedure= LHC/Stent. Patient on cardiac monitor, non-invasive blood pressure, SPO2 monitor. On Room Air . IV  of NS on pump at 118 ml/hr. Patient status doing well without problems. Patient is A&Ox 4. Patient reports No Pain. PROCEDURE SITE CHECK: 
 
Procedure site:without any bleeding and No Hematoma, No pain/discomfort reported at procedure site. No change in patient status. Continue to monitor patient and status.

## 2019-04-25 VITALS
OXYGEN SATURATION: 94 % | RESPIRATION RATE: 18 BRPM | SYSTOLIC BLOOD PRESSURE: 135 MMHG | DIASTOLIC BLOOD PRESSURE: 60 MMHG | HEIGHT: 68 IN | WEIGHT: 261.02 LBS | BODY MASS INDEX: 39.56 KG/M2 | HEART RATE: 84 BPM | TEMPERATURE: 97.5 F

## 2019-04-25 PROBLEM — E03.9 HYPOTHYROIDISM: Status: ACTIVE | Noted: 2019-04-25

## 2019-04-25 PROBLEM — I10 HTN (HYPERTENSION): Status: ACTIVE | Noted: 2019-04-25

## 2019-04-25 PROBLEM — E78.5 HLD (HYPERLIPIDEMIA): Status: ACTIVE | Noted: 2019-04-25

## 2019-04-25 PROBLEM — N17.9 ACUTE KIDNEY INJURY (HCC): Status: ACTIVE | Noted: 2019-04-25

## 2019-04-25 LAB
ANION GAP SERPL CALC-SCNC: 4 MMOL/L (ref 5–15)
BUN SERPL-MCNC: 21 MG/DL (ref 6–20)
BUN/CREAT SERPL: 16 (ref 12–20)
CALCIUM SERPL-MCNC: 9 MG/DL (ref 8.5–10.1)
CHLORIDE SERPL-SCNC: 103 MMOL/L (ref 97–108)
CO2 SERPL-SCNC: 28 MMOL/L (ref 21–32)
CREAT SERPL-MCNC: 1.35 MG/DL (ref 0.7–1.3)
GLUCOSE SERPL-MCNC: 96 MG/DL (ref 65–100)
POTASSIUM SERPL-SCNC: 4.4 MMOL/L (ref 3.5–5.1)
SODIUM SERPL-SCNC: 135 MMOL/L (ref 136–145)

## 2019-04-25 PROCEDURE — 74011250637 HC RX REV CODE- 250/637: Performed by: INTERNAL MEDICINE

## 2019-04-25 PROCEDURE — 36415 COLL VENOUS BLD VENIPUNCTURE: CPT

## 2019-04-25 PROCEDURE — 80048 BASIC METABOLIC PNL TOTAL CA: CPT

## 2019-04-25 RX ORDER — METOPROLOL TARTRATE 25 MG/1
12.5 TABLET, FILM COATED ORAL EVERY 12 HOURS
Qty: 30 TAB | Refills: 0 | Status: SHIPPED | OUTPATIENT
Start: 2019-04-25 | End: 2019-05-25

## 2019-04-25 RX ORDER — ATORVASTATIN CALCIUM 40 MG/1
80 TABLET, FILM COATED ORAL
Status: DISCONTINUED | OUTPATIENT
Start: 2019-04-25 | End: 2019-04-25 | Stop reason: HOSPADM

## 2019-04-25 RX ORDER — CLOPIDOGREL BISULFATE 75 MG/1
75 TABLET ORAL DAILY
Qty: 30 TAB | Refills: 0 | Status: SHIPPED | OUTPATIENT
Start: 2019-04-26 | End: 2019-05-26

## 2019-04-25 RX ORDER — GUAIFENESIN 100 MG/5ML
81 LIQUID (ML) ORAL DAILY
Qty: 30 TAB | Refills: 0 | Status: SHIPPED | OUTPATIENT
Start: 2019-04-26 | End: 2019-05-26

## 2019-04-25 RX ORDER — ATORVASTATIN CALCIUM 80 MG/1
80 TABLET, FILM COATED ORAL
Qty: 30 TAB | Refills: 0 | Status: SHIPPED | OUTPATIENT
Start: 2019-04-25 | End: 2019-05-25

## 2019-04-25 RX ORDER — NITROGLYCERIN 0.4 MG/1
0.4 TABLET SUBLINGUAL
Qty: 10 TAB | Refills: 0 | Status: SHIPPED | OUTPATIENT
Start: 2019-04-25 | End: 2019-05-10

## 2019-04-25 RX ADMIN — CLOPIDOGREL BISULFATE 75 MG: 75 TABLET ORAL at 08:28

## 2019-04-25 RX ADMIN — Medication 10 ML: at 07:12

## 2019-04-25 RX ADMIN — LISINOPRIL 5 MG: 5 TABLET ORAL at 08:29

## 2019-04-25 RX ADMIN — LEVOTHYROXINE SODIUM 137 MCG: 0.11 TABLET ORAL at 07:12

## 2019-04-25 RX ADMIN — ASPIRIN 81 MG CHEWABLE TABLET 81 MG: 81 TABLET CHEWABLE at 08:29

## 2019-04-25 RX ADMIN — METOPROLOL TARTRATE 12.5 MG: 25 TABLET ORAL at 08:28

## 2019-04-25 NOTE — PROGRESS NOTES
[address] April 25, 2019 RE: [place] To Whom It May Concern, This is to certify that [place] may may return to work on 05/02/2019. Please feel free to contact my office if you have any questions or concerns. Thank you for your assistance in this matter.  
 
 
 
 
Sincerely, 
Jacquline Merlin, MD

## 2019-04-25 NOTE — DISCHARGE SUMMARY
Discharge Summary       PATIENT ID: Lisa Mancera  MRN: [id number]   YOB: 1950    DATE OF ADMISSION: 4/23/2019  1:29 AM    DATE OF DISCHARGE: 04/25/2019   PRIMARY CARE PROVIDER: Dev Mullins MD     ATTENDING PHYSICIAN: Dino Park MD  DISCHARGING PROVIDER: Dino Park MD    To contact this individual call [phone] and ask the  to page. If unavailable ask to be transferred the Adult Hospitalist Department. CONSULTATIONS: IP CONSULT TO CARDIOLOGY    PROCEDURES/SURGERIES: Procedure(s):  LEFT HEART CATH / CORONARY ANGIOGRAPHY  PERCUTANEOUS CORONARY INTERVENTION  Fractional Flow [address] COURSE:   NSTEMI with PCI to LAD, RCA  HTN  Pre-diabetes    This is a 59-year-old man with a past medical history significant for hypothyroidism, hypertension, was in his usual state of health until about a week ago when the patient developed chest pain. Troponin elevated on admission, and taken to the cath lab early that morning with both LAD and RCA lesions. Had staged PCI by Dr. Steven Nath with good results, and resolution of symptoms. Started on BB, ACE, ASA, plavix. DISCHARGE DIAGNOSES / PLAN:      Chest pain secondary to NSTEMI - peak trop 0.2, s/p PCI to LAD on 02/23, then PCI to RCA 02/24  - Contineu to monitor overnight  - Cardiology following  - On ASA, plavix, statin, BB, ACEi  - Will need o/p follow up       HTN - on ACE, coreg, continue to monitor  Obesity - BMI 39, counseled regarding weight loss  Prediabetes -  A1c 6.0%, counseled on weight loss, should consider starting metformin outpatient   Hypothyroid - levothyroxine    DAVID - improved. Cr up to 1.58 on admission, improving, possibly will get worse after contrast load, monitor   - I and O, avoid nephrotoxins, continue to monitor.        ADDITIONAL CARE RECOMMENDATIONS:   1. Please take all medications as prescribed. Note changes as below.    - Adding multiple new medications, make sure you take your ASA, plavix as these help keep your stents open. 2. Please make sure to follow up with your primary care physician within 1-2 weeks of discharge for hospital follow up. You will also follow up with the cardiologist in 1 week   3. Please make continue to monitor for increased chest pain and discomfort, shortness of breath, lower extremity swelling. 4. Focus on weight loss, you technically have pre-diabetes as well, and losing some weight will help prevent development of diabetes, and also reduce the risk for any future heart attack. 5. Avoid all NSAIDs, including: aleve, and motrin or ibuprofen. Tylenol is safe. 6. You should have your kidney function tested, and your electrolytes tested by your PCP in about 1 week. PENDING TEST RESULTS:   At the time of discharge the following test results are still pending: None    FOLLOW UP APPOINTMENTS:    Follow-up Information     Follow up With Specialties Details Why Contact [facility] Call on 4/27/2019 One-time [facility] to home visit. Call agency if you have not heard from them by 12:00 p.m. [address] [place] [address]  [phone]             DIET: Cardiac Diet and Diabetic Diet    ACTIVITY: Activity as tolerated    WOUND CARE: None    EQUIPMENT needed: none      DISCHARGE MEDICATIONS:  Current Discharge Medication List      START taking these medications    Details   aspirin 81 mg chewable tablet Take 1 Tab by mouth daily for 30 days. Qty: 30 Tab, Refills: 0      clopidogrel (PLAVIX) 75 mg tab Take 1 Tab by mouth daily for 30 days. Qty: 30 Tab, Refills: 0      metoprolol tartrate (LOPRESSOR) 25 mg tablet Take 0.5 Tabs by mouth every twelve (12) hours for 30 days. Qty: 30 Tab, Refills: 0      nitroglycerin (NITROSTAT) 0.4 mg SL tablet 1 Tab by SubLINGual route every five (5) minutes as needed for Chest Pain for up to 15 days. Up to 3 doses.   Qty: 10 Tab, Refills: 0      atorvastatin (LIPITOR) 80 mg tablet Take 1 Tab by mouth nightly for 30 days. Qty: 30 Tab, Refills: 0         CONTINUE these medications which have NOT CHANGED    Details   levothyroxine (SYNTHROID) 137 mcg tablet Take 137 mcg by mouth every morning. acetaminophen (TYLENOL) 500 mg tablet Take 500 mg by mouth every six (6) hours as needed for Pain. lisinopril (PRINIVIL, ZESTRIL) 5 mg tablet Take 5 mg by mouth daily. STOP taking these medications       ibuprofen (MOTRIN) 200 mg tablet Comments:   Reason for Stopping:                 NOTIFY YOUR PHYSICIAN FOR ANY OF THE FOLLOWING:   Fever over 101 degrees for 24 hours. Chest pain, shortness of breath, fever, chills, nausea, vomiting, diarrhea, change in mentation, falling, weakness, bleeding. Severe pain or pain not relieved by medications. Or, any other signs or symptoms that you may have questions about.     DISPOSITION:   X Home With:   OT  PT X HH  RN       Long term SNF/Inpatient Rehab    Independent/assisted living    Hospice    Other:       PATIENT CONDITION AT DISCHARGE:     Functional status    Poor     Deconditioned    X Independent      Cognition    X Lucid     Forgetful     Dementia      Catheters/lines (plus indication)    [place]     PICC     PEG    X None      Code status   X  Full code     DNR      PHYSICAL EXAMINATION AT DISCHARGE:  Visit Vitals  /60 (BP 1 Location: Left arm, BP Patient Position: At rest)   Pulse 84   Temp 97.5 °F (36.4 °C)   Resp 18   Ht 5' 8\" (1.727 m)   Wt 118.4 kg (261 lb 0.4 oz)   SpO2 94%   BMI 39.69 kg/m²     Gen: NAD, awake in bed  HEENT: NC/AT, sclera anicteric, PERRL, EOMI  CV: RRR no m/r/g  Resp: CTA b/l no increased work of breathing  Abd: Obese, NT/ND, normal bowel sounds  Ext: 2+ pulses, no edema  Skin: No rashes        CHRONIC MEDICAL DIAGNOSES:  Problem List as of 4/25/2019 Date Reviewed: 4/25/2019          Codes Class Noted - Resolved    HLD (hyperlipidemia) ICD-10-CM: E78.5  ICD-9-CM: 272.4  4/25/2019 - Present        HTN (hypertension) ICD-10-CM: I10  ICD-9-CM: 401.9  4/25/2019 - Present        Hypothyroidism ICD-10-CM: E03.9  ICD-9-CM: 244.9  4/25/2019 - Present        Acute kidney injury ([facility] [address] ICD-10-CM: N17.9  ICD-9-CM: 584.9  4/25/2019 - Present        * (Principal) NSTEMI (non-ST elevated myocardial infarction) [facility]) ICD-10-CM: I21.4  ICD-9-CM: 410.70  4/23/2019 - Present              Greater than 30 minutes were spent with the patient on counseling and coordination of care    Signed:   Jayne Quinteros MD  4/25/2019  8:42 AM

## 2019-04-25 NOTE — PROGRESS NOTES
Hospital follow-up PCP transitional care appointment has been scheduled with NICANOR Jonas for Thursday, 5/2/19  at 10:30 a.m. Pending patient discharge.   Ai Max, Care Management Specialist.

## 2019-04-25 NOTE — PROGRESS NOTES
The patient has been authorized for [facility] for NSTEMI upon discharge with [facility]. Information added to AVS. FRENCH Reese 
 
CM met with patient at bedside- denied having concerns for transition home today, family to transport. IM letter signed- signed copy placed on chart, additional copies left with patient. No other needs or concerns identified at this time.  FRENCH Reese

## 2019-04-25 NOTE — CARDIO/PULMONARY
Cardiac Rehab: Met with Salud Torre to answer any outstanding questions prior to discharge. Discussed the location of the cardiac rehab program. Awaiting benefit information. Will follow by phone for enrollment, once benefits are obtained. Salud Torre verbalized understanding and has no additional questions at this time.  Ajit Waller RN

## 2019-04-25 NOTE — PROGRESS NOTES
I have reviewed discharge instructions with the patient, spouse and sister. The patient, spouse and sister verbalized understanding. IV's removed. Sister picked up bedside RX.

## 2019-04-25 NOTE — DISCHARGE INSTRUCTIONS
Discharge Instructions       PATIENT ID: Matt Helsm  MRN: [id number]   YOB: 1950    DATE OF ADMISSION: 4/23/2019  1:29 AM    DATE OF DISCHARGE: 4/25/2019    PRIMARY CARE PROVIDER: Rocco Gusman MD     ATTENDING PHYSICIAN: Luis Antonio Gonzalez*  DISCHARGING PROVIDER: Henrique Mary MD    To contact this individual call [phone] and ask the  to page. If unavailable ask to be transferred the Adult Hospitalist Department. DISCHARGE DIAGNOSES   NSTEMI with PCI to LAD, RCA  HTN  Pre-diabetes      CONSULTATIONS: IP CONSULT TO CARDIOLOGY    PROCEDURES/SURGERIES: Procedure(s):  LEFT HEART CATH / CORONARY ANGIOGRAPHY  PERCUTANEOUS CORONARY INTERVENTION  Fractional Flow [place]    PENDING TEST RESULTS:   At the time of discharge the following test results are still pending: None    FOLLOW UP APPOINTMENTS:   Follow-up Information     Follow up With Specialties Details Why Contact Matt [facility]es [address] Call on 4/27/2019 [facility]-time skilled nursing hospital to home visit. Call agency if you have not heard from them by 12:00 p.m.  [address]    Rocco Gusman MD Family Practice In 1 week [facility] follow up  [address]      Malik Avila MD Cardiology In 1 week Hospital follow up with cardiologist.  [address] [facility]  [address] [place] [address] Way  [phone]             ADDITIONAL CARE RECOMMENDATIONS: 1. Please take all medications as prescribed. Note changes as below. - Adding multiple new medications, make sure you take your ASA, plavix as these help keep your stents open. 2. Please make sure to follow up with your primary care physician within 1-2 weeks of discharge for hospital follow up. You will also follow up with the cardiologist in 1 week   3.  Please make continue to monitor for increased chest pain and discomfort, shortness of breath, lower extremity swelling. 4. Focus on weight loss, you technically have pre-diabetes as well, and losing some weight will help prevent development of diabetes, and also reduce the risk for any future heart attack. 5. Avoid all NSAIDs, including: aleve, and motrin or ibuprofen. Tylenol is safe. 6. You should have your kidney function tested, and your electrolytes tested by your PCP in about 1 week. DIET: Cardiac Diet and Diabetic Diet    ACTIVITY: Activity as tolerated    WOUND CARE: None    EQUIPMENT needed: none      DISCHARGE MEDICATIONS:   See Medication Reconciliation Form    · It is important that you take the medication exactly as they are prescribed. · Keep your medication in the bottles provided by the pharmacist and keep a list of the medication names, dosages, and times to be taken in your wallet. · Do not take other medications without consulting your doctor. NOTIFY YOUR PHYSICIAN FOR ANY OF THE FOLLOWING:   Fever over 101 degrees for 24 hours. Chest pain, shortness of breath, fever, chills, nausea, vomiting, diarrhea, change in mentation, falling, weakness, bleeding. Severe pain or pain not relieved by medications. Or, any other signs or symptoms that you may have questions about. DISPOSITION:    Home With:   OT  PT X HH  RN       SNF/Inpatient Rehab/LTAC    Independent/assisted living    Hospice    Other:     CDMP Checked: Yes X     PROBLEM LIST Updated:   Yes X       Signed:   Rhona Woodard MD  4/25/2019  8:55 AM

## 2019-04-26 ENCOUNTER — HOME CARE VISIT (OUTPATIENT)
Dept: SCHEDULING | Facility: HOME HEALTH | Age: 69
End: 2019-04-26

## 2019-04-26 PROCEDURE — G0299 HHS/HOSPICE OF RN EA 15 MIN: HCPCS

## 2019-04-29 ENCOUNTER — HOME CARE VISIT (OUTPATIENT)
Dept: SCHEDULING | Facility: HOME HEALTH | Age: 69
End: 2019-04-29

## 2019-04-29 PROCEDURE — G0299 HHS/HOSPICE OF RN EA 15 MIN: HCPCS

## 2019-05-02 ENCOUNTER — TELEPHONE (OUTPATIENT)
Dept: CARDIAC REHAB | Age: 69
End: 2019-05-02

## 2019-05-02 NOTE — TELEPHONE ENCOUNTER
Cardiac Rehab: Yane Sherman wanted his insurance benefits prior to deciding about participation in cardiac rehab. Cardiac rehab has attempted to get his insurance benefits and have been unsuccessful. Call placed to Yane Sherman to discuss him attempting to get benefits and there was no answer. Left a voice mail message requesting a call back. Will follow the week of 5/3/19.  Hiwot Dunbar RN

## 2019-05-03 ENCOUNTER — TELEPHONE (OUTPATIENT)
Dept: CARDIAC REHAB | Age: 69
End: 2019-05-03

## 2019-05-03 NOTE — TELEPHONE ENCOUNTER
5/3/2019 Cardiac Rehab: Received a call back from [facility] Stephane's wife. Reviewed the cardiac rehab program, format, and benefits. Discussed our inability to obtain insurance benefits and encouraged her to contact the insurance company herself for benefits. Mrs. Nevin Lora shared that the patient will probably not be able to afford cardiac rehab \"since he isn't working\". Encouraged her to review the education material given to the patient (MI/CAD education folder) for assistance with lifestyle changes and dietary guidelines for heart health. She also shared that they [facility] a dietician with [facility]\". I encouraged her to use all the resources she has available to assist in heart healthy lifestyle changes. Daniel Witt verbalized understanding with questions answered. Cardiac Rehab: Gerardo Teixeira wanted his insurance benefits prior to deciding about participation in cardiac rehab. Cardiac rehab has attempted to get his insurance benefits and have been unsuccessful.      Call placed to Gerardo Teixeira to discuss him attempting to get benefits and there was no answer. Left a voice mail message requesting a call back. Will follow the week of 5/3/19.  Flora Stevenson RN

## 2022-01-31 NOTE — ROUTINE PROCESS
HR=51 bpm, QTCN=669/45 mmhg, SpO2=97.0 %, Resp=11 B/min, EtCO2=32 mmHg, Apnea=2 Seconds, Pain=0, Joss=9, Artis=2 Joyce Logan 1950 
[id number] Situation: 
Verbal report received from: Phyllis Procedure: Procedure(s): 
COLONOSCOPY 
ENDOSCOPIC POLYPECTOMY RESOLUTION CLIP Background: 
 
Preoperative diagnosis: BLOOD IN STOOL Postoperative diagnosis: 1.- Colonic Polyps 2.- Diverticulosis :  Dr. Bella Alonso Assistant(s): Endoscopy Technician-1: Marcelino Nichols Endoscopy RN-1: Kailey Higgins RN Specimens:  
ID Type Source Tests Collected by Time Destination 1 : Ascending Colon Polyp Preservative   Lou Anderson MD 1/7/2019 1104 Pathology 2 : Transverse Colon Polyp Preservative   Lou Anderson MD 1/7/2019 1107 Pathology 3 : Descending Colon Polyp Preservative   Lou Anderson MD 1/7/2019 1111 Pathology 4 : Sigmoid Colon Polyp Preservative   Lou Anderson MD 1/7/2019 1113 Pathology H. Pylori  no Assessment: 
Intra-procedure medications Anesthesia gave intra-procedure sedation and medications, see anesthesia flow sheet yes Intravenous fluids: NS@ Clydene Rambo Vital signs stable Abdominal assessment: round and soft Recommendation: 
Discharge patient per MD order. Family or Friend Permission to share finding with family or friend yes

## 2022-03-18 PROBLEM — E78.5 HLD (HYPERLIPIDEMIA): Status: ACTIVE | Noted: 2019-04-25

## 2022-03-19 PROBLEM — I10 HTN (HYPERTENSION): Status: ACTIVE | Noted: 2019-04-25

## 2022-03-19 PROBLEM — E03.9 HYPOTHYROIDISM: Status: ACTIVE | Noted: 2019-04-25

## 2022-03-19 PROBLEM — I21.4 NSTEMI (NON-ST ELEVATED MYOCARDIAL INFARCTION) (HCC): Status: ACTIVE | Noted: 2019-04-23

## 2022-03-20 PROBLEM — N17.9 ACUTE KIDNEY INJURY: Status: ACTIVE | Noted: 2019-04-25

## 2025-05-16 ENCOUNTER — HOSPITAL ENCOUNTER (OUTPATIENT)
Facility: HOSPITAL | Age: 75
Setting detail: OUTPATIENT SURGERY
Discharge: HOME | End: 2025-05-16
Attending: INTERNAL MEDICINE | Admitting: INTERNAL MEDICINE
Payer: MEDICARE

## 2025-05-16 ENCOUNTER — ANESTHESIA (OUTPATIENT)
Facility: HOSPITAL | Age: 75
End: 2025-05-16
Payer: MEDICARE

## 2025-05-16 ENCOUNTER — ANESTHESIA EVENT (OUTPATIENT)
Facility: HOSPITAL | Age: 75
End: 2025-05-16
Payer: MEDICARE

## 2025-05-16 VITALS
WEIGHT: 252 LBS | TEMPERATURE: 97.6 F | OXYGEN SATURATION: 95 % | DIASTOLIC BLOOD PRESSURE: 62 MMHG | HEART RATE: 75 BPM | SYSTOLIC BLOOD PRESSURE: 139 MMHG | HEIGHT: 68 IN | BODY MASS INDEX: 38.19 KG/M2 | RESPIRATION RATE: 19 BRPM

## 2025-05-16 PROCEDURE — 7100000010 HC PHASE II RECOVERY - FIRST 15 MIN: Performed by: INTERNAL MEDICINE

## 2025-05-16 PROCEDURE — 6360000002 HC RX W HCPCS

## 2025-05-16 PROCEDURE — 3600007502: Performed by: INTERNAL MEDICINE

## 2025-05-16 PROCEDURE — 3700000000 HC ANESTHESIA ATTENDED CARE: Performed by: INTERNAL MEDICINE

## 2025-05-16 PROCEDURE — 88305 TISSUE EXAM BY PATHOLOGIST: CPT

## 2025-05-16 PROCEDURE — 2580000003 HC RX 258: Performed by: INTERNAL MEDICINE

## 2025-05-16 PROCEDURE — 7100000011 HC PHASE II RECOVERY - ADDTL 15 MIN: Performed by: INTERNAL MEDICINE

## 2025-05-16 PROCEDURE — 2709999900 HC NON-CHARGEABLE SUPPLY: Performed by: INTERNAL MEDICINE

## 2025-05-16 RX ORDER — ACETAMINOPHEN 500 MG
500 TABLET ORAL EVERY 6 HOURS PRN
COMMUNITY

## 2025-05-16 RX ORDER — LISINOPRIL 5 MG/1
5 TABLET ORAL DAILY
COMMUNITY

## 2025-05-16 RX ORDER — LEVOTHYROXINE SODIUM 137 UG/1
137 TABLET ORAL
COMMUNITY

## 2025-05-16 RX ORDER — ASPIRIN 81 MG/1
81 TABLET ORAL
COMMUNITY
Start: 2025-01-30

## 2025-05-16 RX ORDER — ATORVASTATIN CALCIUM 40 MG/1
40 TABLET, FILM COATED ORAL
COMMUNITY
Start: 2025-01-30

## 2025-05-16 RX ORDER — SODIUM CHLORIDE 9 MG/ML
INJECTION, SOLUTION INTRAVENOUS CONTINUOUS
Status: DISCONTINUED | OUTPATIENT
Start: 2025-05-16 | End: 2025-05-16 | Stop reason: HOSPADM

## 2025-05-16 RX ORDER — LOSARTAN POTASSIUM 25 MG
25 TABLET ORAL
COMMUNITY
Start: 2025-01-30

## 2025-05-16 RX ORDER — SODIUM CHLORIDE 0.9 % (FLUSH) 0.9 %
5-40 SYRINGE (ML) INJECTION EVERY 12 HOURS SCHEDULED
Status: DISCONTINUED | OUTPATIENT
Start: 2025-05-16 | End: 2025-05-16 | Stop reason: HOSPADM

## 2025-05-16 RX ORDER — LIDOCAINE HYDROCHLORIDE 20 MG/ML
INJECTION, SOLUTION EPIDURAL; INFILTRATION; INTRACAUDAL; PERINEURAL
Status: DISCONTINUED | OUTPATIENT
Start: 2025-05-16 | End: 2025-05-16 | Stop reason: SDUPTHER

## 2025-05-16 RX ORDER — SODIUM CHLORIDE 0.9 % (FLUSH) 0.9 %
5-40 SYRINGE (ML) INJECTION PRN
Status: DISCONTINUED | OUTPATIENT
Start: 2025-05-16 | End: 2025-05-16 | Stop reason: HOSPADM

## 2025-05-16 RX ORDER — METOPROLOL SUCCINATE 100 MG/1
100 TABLET, EXTENDED RELEASE ORAL
COMMUNITY
Start: 2025-01-30

## 2025-05-16 RX ORDER — ALBUTEROL SULFATE 90 UG/1
AEROSOL, METERED RESPIRATORY (INHALATION)
COMMUNITY
Start: 2025-01-30

## 2025-05-16 RX ORDER — PHENYLEPHRINE HCL IN 0.9% NACL 0.4MG/10ML
SYRINGE (ML) INTRAVENOUS
Status: DISCONTINUED | OUTPATIENT
Start: 2025-05-16 | End: 2025-05-16 | Stop reason: SDUPTHER

## 2025-05-16 RX ORDER — SODIUM CHLORIDE 9 MG/ML
INJECTION, SOLUTION INTRAVENOUS PRN
Status: DISCONTINUED | OUTPATIENT
Start: 2025-05-16 | End: 2025-05-16 | Stop reason: HOSPADM

## 2025-05-16 RX ADMIN — PROPOFOL 50 MG: 10 INJECTION, EMULSION INTRAVENOUS at 08:40

## 2025-05-16 RX ADMIN — SODIUM CHLORIDE: 9 INJECTION, SOLUTION INTRAVENOUS at 08:38

## 2025-05-16 RX ADMIN — Medication 80 MCG: at 08:45

## 2025-05-16 RX ADMIN — PROPOFOL 30 MG: 10 INJECTION, EMULSION INTRAVENOUS at 08:43

## 2025-05-16 RX ADMIN — LIDOCAINE HYDROCHLORIDE 50 MG: 20 INJECTION, SOLUTION EPIDURAL; INFILTRATION; INTRACAUDAL; PERINEURAL at 08:40

## 2025-05-16 RX ADMIN — PROPOFOL 30 MG: 10 INJECTION, EMULSION INTRAVENOUS at 08:46

## 2025-05-16 NOTE — OP NOTE
GLENNA MCDANIEL       Colonoscopy Operative Report    Scott Colmenares  [id number]  1950      Procedure Type:   Colonoscopy with polypectomy (hot biopsy)     Indications:    abnormal PET scan , high uptake in colon         Pre-operative Diagnosis: see indication above    Post-operative Diagnosis:  See findings below    :  Mo Rivera MD    Surgical Assistant: Buckyulator: Denis Vang RN  Endoscopy Technician: Kelby Barnett    Implants:  None    Referring Provider: Fransisco Clifton MD      Sedation:  MAC anesthesia Propofol      Procedure Details:  After informed consent was obtained with all risks and benefits of procedure explained and preoperative exam completed, the patient was taken to the endoscopy suite and placed in the left lateral decubitus position.  Upon sequential sedation as per above, a digital rectal exam was performed demonstrating internal hemorrhoids.  The Olympus videocolonoscope  was inserted in the rectum and carefully advanced to the cecum, which was identified by the ileocecal valve and appendiceal orifice.  The cecum was identified by the ileocecal valve and appendiceal orifice.  The quality of preparation was good.  The colonoscope was slowly withdrawn with careful evaluation between folds. Retroflexion in the rectum was completed .     Findings:   Rectum: normal  Small internal hemorrhoids     Sigmoid: moderate diverticulosis;    1 cm sessile polyp removed by hot biopsies    Descending Colon: normal  Transverse Colon: normal  Ascending Colon: normal  Cecum: normal    Specimen Removed:   as above     Complications: None.     EBL:  None.    Impression:     see findings     Recommendations: --Await pathology.      Recommendation for next colonscopy in 3 years  High fiber diet  Resume eliquis on 5/19/2025    Signed By: Mo Rivera MD     5/16/2025  8:53 AM

## 2025-05-16 NOTE — PROGRESS NOTES
Verified patient name and date of birth, scheduled procedure, and informed consent. Reviewed general discharge instructions and  information.  Assessed patient. Awake, alert, and oriented per baseline. Vital signs stable (see vital sign flowsheet). Respiratory status within defined limits, abdomen soft and non tender. Skin with in defined limits.     Initial RN admission and assessment performed and documented in Endoscopy navigator.     Patient evaluated by anesthesia in pre-procedure holding.     All procedural vital signs, airway assessment, and level of consciousness information monitored and recorded by anesthesia staff on the anesthesia record.     Report received from CRNA post procedure.  Patient transported to recovery area by RN.    Endoscopy post procedure time out was performed and specimens were verified with physician.    Endoscope was pre-cleaned at bedside immediately following procedure by Kelby.

## 2025-05-16 NOTE — ANESTHESIA POSTPROCEDURE EVALUATION
Department of Anesthesiology  Postprocedure Note    Patient: Scott Colmenares  MRN: [id number]  YOB: 1950  Date of evaluation: 5/16/2025    Procedure Summary       Date: 05/16/25 Room / Location: [facility] ENDO 01 / [facility] ENDOSCOPY    Anesthesia Start: 0837 Anesthesia Stop: 0854    Procedure: COLONOSCOPY Diagnosis:       Abnormal PET scan of colon      (Abnormal PET scan of colon [R94.8])    Surgeons: Mo Rivera MD Responsible Provider: Param Brasher MD    Anesthesia Type: MAC ASA Status: 3            Anesthesia Type: MAC    Francois Phase I: Francois Score: 10    Francois Phase II:      Anesthesia Post Evaluation    Patient location during evaluation: bedside  Nausea & Vomiting: no nausea  Cardiovascular status: blood pressure returned to baseline  Respiratory status: acceptable  Hydration status: euvolemic    No notable events documented.

## 2025-05-16 NOTE — ANESTHESIA PRE PROCEDURE
Department of Anesthesiology  Preprocedure Note       Name:  Scott Colmenares   Age:  74 y.o.  :  1950                                          MRN:  [id number]         Date:  2025      Surgeon: Surgeon(s):  Mo Rivera MD    Procedure: Procedure(s):  COLONOSCOPY    Medications prior to admission:   Prior to Admission medications    Medication Sig Start Date End Date Taking? Authorizing Provider   acetaminophen (TYLENOL) 500 MG tablet Take 1 tablet by mouth every 6 hours as needed   Yes Provider, MD Amna   PROAIR  (90 Base) MCG/ACT inhaler Inhale into the lungs 25  Yes Provider, MD Amna   apixaban (ELIQUIS) 5 MG TABS tablet Take 1 tablet by mouth 25  Yes ProviderAmna MD   aspirin 81 MG EC tablet Take 1 tablet by mouth 25  Yes Provider, MD Amna   atorvastatin (LIPITOR) 40 MG tablet Take 1 tablet by mouth 25  Yes Provider, MD Amna   levothyroxine (SYNTHROID) 137 MCG tablet Take 1 tablet by mouth   Yes Provider, MD Amna   lisinopril (PRINIVIL;ZESTRIL) 5 MG tablet Take 1 tablet by mouth daily   Yes Provider, MD Amna   COZAAR 25 MG tablet Take 1 tablet by mouth 25  Yes Provider, MD Amna   TOPROL  MG extended release tablet Take 1 tablet by mouth 25  Yes Provider, MD Amna       Current medications:    Current Facility-Administered Medications   Medication Dose Route Frequency Provider Last Rate Last Admin   • 0.9 % sodium chloride infusion   IntraVENous Continuous Mo Rivera MD   New Bag at 25 0838   • sodium chloride flush 0.9 % injection 5-40 mL  5-40 mL IntraVENous 2 times per day Mo Rivera MD       • sodium chloride flush 0.9 % injection 5-40 mL  5-40 mL IntraVENous PRN Mo Rivera MD       • 0.9 % sodium chloride infusion   IntraVENous PRN Mo Rivera MD           Allergies:  No Known Allergies    Problem List:    Patient Active Problem List

## 2025-05-16 NOTE — H&P
[facility]     History and Physical       NAME:  Scott Colmenares   :   1950   MRN:   [id number]             History of Present Illness:  Patient is a 74 y.o. who is seen for high uptake on PET scan and h/o colon polyps .     PMH:  Past Medical History:   Diagnosis Date    Cancer (HCC)     lung    COPD (chronic obstructive pulmonary disease) (HCC)     Hyperlipidemia     Hypertension     MI (myocardial infarction) (HCC) 2019    x3 stents    Thyroid disease        PSH:  Past Surgical History:   Procedure Laterality Date    EP STUDY/ABLATION         Allergies:  No Known Allergies    Home Medications:  Prior to Admission Medications   Prescriptions Last Dose Informant Patient Reported? Taking?   COZAAR 25 MG tablet 2025 Morning  Yes Yes   Sig: Take 1 tablet by mouth   PROAIR  (90 Base) MCG/ACT inhaler 2025 Morning  Yes Yes   Sig: Inhale into the lungs   TOPROL  MG extended release tablet 2025 Morning  Yes Yes   Sig: Take 1 tablet by mouth   acetaminophen (TYLENOL) 500 MG tablet Past Month  Yes Yes   Sig: Take 1 tablet by mouth every 6 hours as needed   apixaban (ELIQUIS) 5 MG TABS tablet Past Week  Yes Yes   Sig: Take 1 tablet by mouth   aspirin 81 MG EC tablet Past Week  Yes Yes   Sig: Take 1 tablet by mouth   atorvastatin (LIPITOR) 40 MG tablet 2025 Morning  Yes Yes   Sig: Take 1 tablet by mouth   levothyroxine (SYNTHROID) 137 MCG tablet 5/15/2025  Yes Yes   Sig: Take 1 tablet by mouth   lisinopril (PRINIVIL;ZESTRIL) 5 MG tablet 2025 Morning  Yes Yes   Sig: Take 1 tablet by mouth daily      Facility-Administered Medications: None       Hospital Medications:  Current Facility-Administered Medications   Medication Dose Route Frequency    0.9 % sodium chloride infusion   IntraVENous Continuous    sodium chloride flush 0.9 % injection 5-40 mL  5-40 mL IntraVENous 2 times per day    sodium chloride flush 0.9 % injection 5-40 mL  5-40 mL IntraVENous PRN    0.9 % sodium chloride  \"PLT\" in the last 72 hours.  No results for input(s): \"NA\", \"K\", \"CL\", \"CO2\", \"BUN\", \"GLU\", \"PHOS\" in the last 72 hours.    Invalid input(s): \"CREA\", \"CA\"  No results for input(s): \"TP\", \"GLOB\", \"GGT\" in the last 72 hours.    Invalid input(s): \"SGOT\", \"GPT\", \"AP\", \"TBIL\", \"ALB\", \"AML\", \"AMYP\", \"LPSE\", \"HLPSE\"  No results for input(s): \"INR\", \"APTT\" in the last 72 hours.    Invalid input(s): \"PTP\"       Assessment:     H/o colon polyps and high uptake on PET scan      Patient Active Problem List   Diagnosis    HLD (hyperlipidemia)    NSTEMI (non-ST elevated myocardial infarction) (HCC)    HTN (hypertension)    Hypothyroidism    Acute kidney injury         Plan:     Endoscopic procedure with sedation     Signed By: Mo Rivera MD     5/16/2025  8:36 AM

## 2025-05-16 NOTE — DISCHARGE INSTRUCTIONS
[facility]  [address] [place] [id number]    COLON DISCHARGE INSTRUCTIONS    Scott Colmenares  [id number]  1950    Discomfort:  Redness at IV site- apply warm compress to area; if redness or soreness persist- contact your physician  There may be a slight amount of blood passed from the rectum  Gaseous discomfort- walking, belching will help relieve any discomfort  You may not operate a vehicle for 12 hours  You may not engage in an occupation involving machinery or appliances for rest of today  You may not drink alcoholic beverages for at least 12 hours  Avoid making any critical decisions for at least 24 hour  DIET:  You may resume your regular diet - however -  remember your colon is empty and a heavy meal will produce gas.  Avoid these foods:  vegetables, fried / greasy foods, carbonated drinks     ACTIVITY:  You may  resume your normal daily activities it is recommended that you spend the remainder of the day resting -  avoid any strenuous activity.    CALL M.D.  ANY SIGN OF:   Increasing pain, nausea, vomiting  Abdominal distension (swelling)  New increased bleeding (oral or rectal)  Fever (chills)  Pain in chest area  Bloody discharge from nose or mouth  Shortness of breath      Follow-up Instructions:   Call Dr. Mo Rivera for any questions or problems at [phone]   High fiber diet   Resume Eliquis on Monday 5/19/2025          ENDOSCOPY FINDINGS:   Your colonoscopy showed one polyp removed and diverticulosis, rest of exam was normal .  Telephone # [phone]      Signed By: Mo Rivera MD     5/16/2025  8:56 AM       DISCHARGE SUMMARY from Nurse    The following personal items collected during your admission are returned to you:   Dental Appliance:    Vision:    Hearing Aid:    Jewelry:    Clothing:    Other Valuables:    Valuables sent to safe:        Learning About Coronavirus (COVID-19)  Coronavirus (COVID-19): Overview  What is coronavirus  care. But call the doctor's office first so they know you're coming. And wear a face mask, if you have one.  If you have a face mask, wear it whenever you're around other people. It can help stop the spread of the virus when you cough or sneeze.  Clean and disinfect your home every day. Use household  and disinfectant wipes or sprays. Take special care to clean things that you grab with your hands. These include doorknobs, remote controls, phones, and handles on your refrigerator and microwave. And don't forget countertops, tabletops, bathrooms, and computer keyboards.  When to call for help  Call 911 anytime you think you may need emergency care. For example, call if:  You have severe trouble breathing. (You can't talk at all.)  You have constant chest pain or pressure.  You are severely dizzy or lightheaded.  You are confused or can't think clearly.  Your face and lips have a blue color.  You pass out (lose consciousness) or are very hard to wake up.  Call your doctor now if you develop symptoms such as:  Shortness of breath.  Fever.  Cough.  If you need to get care, call ahead to the doctor's office for instructions before you go. Make sure you wear a face mask, if you have one, to prevent exposing other people to the virus.  Where can you get the latest information?  The following health organizations are tracking and studying this virus. Their websites contain the most up-to-date information. You'll also learn what to do if you think you may have been exposed to the virus.  U[place]. Centers for Disease Control and Prevention (CDC): The CDC provides updated news about the disease and travel advice. The website also tells you how to prevent the spread of infection. www.cdc.gov  World Health Organization (WHO): WHO offers information about the virus outbreaks. WHO also has travel advice. www.who.int  Current as of: April 1, 2020               Content Version: 12.4  © 7378-8464 [employer].   Care

## (undated) DEVICE — TRAP SURG QUAD PARABOLA SLOT DSGN SFTY SCRN TRAPEASE

## (undated) DEVICE — KIT MFLD ISOLATN NACL CNTRST PRT TBNG SPIK W/ PRSS TRNSDUC

## (undated) DEVICE — ANGIOGRAPHY KIT

## (undated) DEVICE — PINNACLE INTRODUCER SHEATH: Brand: PINNACLE

## (undated) DEVICE — COPILOT BLEEDBACK CONTROL VALVE: Brand: COPILOT

## (undated) DEVICE — GLIDESHEATH SLENDER ACCESS KIT: Brand: GLIDESHEATH SLENDER

## (undated) DEVICE — SPLINT WR POS F/ARTERIAL ACC -- BX/10

## (undated) DEVICE — GUIDEWIRE VASC L260CM DIA0.035IN TIP L3MM STD EXCHG PTFE J

## (undated) DEVICE — KIT HND CTRL 3 W STPCOCK ROT END 54IN PREM HI PRSS TBNG AT

## (undated) DEVICE — KENDALL RADIOLUCENT FOAM MONITORING ELECTRODE -RECTANGULAR SHAPE: Brand: KENDALL

## (undated) DEVICE — CATH DIAG AL1 IMPLS 5FRX100CM -- IMPULSE 16391-96

## (undated) DEVICE — SET ADMIN 16ML TBNG L100IN 2 Y INJ SITE IV PIGGY BK DISP

## (undated) DEVICE — CANN NASAL O2 CAPNOGRAPHY AD -- FILTERLINE

## (undated) DEVICE — Device

## (undated) DEVICE — GUIDEWIRE VASC L185CM DIA0.014IN HYDRPHLC PTFE STR TIP

## (undated) DEVICE — Device: Brand: MEDICAL ACTION INDUSTRIES

## (undated) DEVICE — SUPPLEMENT DIGESTIVE H2O SOL GI-EASE

## (undated) DEVICE — CATH GUID COR EB35 6FR 100CM -- LAUNCHER

## (undated) DEVICE — CONTAINER SPEC 20 ML LID NEUT BUFF FORMALIN 10 % POLYPR STS

## (undated) DEVICE — CATHETER BLLN SPRINTER OTW 138CM 12MM DIA2.5MM CROSSING PROF

## (undated) DEVICE — ELECTRODE PT RET AD L9FT HI MOIST COND ADH HYDRGEL CORDED

## (undated) DEVICE — ANGIOGRAPHIC CATHETER: Brand: IMPULSE™

## (undated) DEVICE — 3M™ TEGADERM™ TRANSPARENT FILM DRESSING FRAME STYLE, 1626W, 4 IN X 4-3/4 IN (10 CM X 12 CM), 50/CT 4CT/CASE: Brand: 3M™ TEGADERM™

## (undated) DEVICE — PINNACLE PRECISION ACCESS SYSTEM INTRODUCER SHEATH: Brand: PINNACLE PRECISION ACCESS SYSTEM

## (undated) DEVICE — CATHETER BLLN 2MMX15MM 138CM SPRINTER OTW

## (undated) DEVICE — REM POLYHESIVE ADULT PATIENT RETURN ELECTRODE: Brand: VALLEYLAB

## (undated) DEVICE — TOWEL,OR,DSP,ST,BLUE,STD,2/PK,40PK/CS: Brand: MEDLINE

## (undated) DEVICE — CATH DIAG AL2 IMPLS 5FRX100CM -- IMPULSE 16391-98

## (undated) DEVICE — FCPS BX HOT RJ4 2.2MMX240CM -- RADIAL JAW 4 BX/40

## (undated) DEVICE — FORCEPS BX L L240CM DIA2.4MM RAD JAW 4 HOT FOR POLYP DISP

## (undated) DEVICE — BAG BELONG PT PERS CLEAR HANDL

## (undated) DEVICE — Z DISCONTINUED USE 2751540 TUBING IRRIG L10IN DISP PMP ENDOGATOR

## (undated) DEVICE — QUILTED PREMIUM COMFORT UNDERPAD,EXTRA HEAVY: Brand: WINGS

## (undated) DEVICE — Z DISCONTINUED NO SUB IDED SET EXTN W/ 4 W STPCOCK M SPIN LOK 36IN

## (undated) DEVICE — SPECIAL PROCEDURE DRAPE 32" X 34": Brand: SPECIAL PROCEDURE DRAPE

## (undated) DEVICE — CATH DIAG IMPLS FL4 5FRX100CM -- IMPULSE 16391-22

## (undated) DEVICE — BW-412T DISP COMBO CLEANING BRUSH: Brand: SINGLE USE COMBINATION CLEANING BRUSH

## (undated) DEVICE — CONNECTOR TBNG AUX H2O JET DISP FOR OLY 160/180 SER

## (undated) DEVICE — BAG SPEC BIOHZD LF 2MIL 6X10IN -- CONVERT TO ITEM 357326

## (undated) DEVICE — RUNTHROUGH NS EXTRA FLOPPY PTCA GUIDEWIRE: Brand: RUNTHROUGH

## (undated) DEVICE — SYRINGE MED 20ML STD CLR PLAS LUERLOCK TIP N CTRL DISP

## (undated) DEVICE — 1200 GUARD II KIT W/5MM TUBE W/O VAC TUBE: Brand: GUARDIAN

## (undated) DEVICE — PACK PROCEDURE SURG HRT CATH

## (undated) DEVICE — HI-TORQUE WHISPER MS GUIDE WIRE .014 STRAIGHT TIP 3.0 CM X 300 CM: Brand: HI-TORQUE WHISPER

## (undated) DEVICE — CUSTOM KT PTCA INFL DEV K05 00052M

## (undated) DEVICE — NEEDLE HYPO 18GA L1.5IN PNK S STL HUB POLYPR SHLD REG BVL

## (undated) DEVICE — AIRLIFE™ U/CONNECT-IT OXYGEN TUBING 7 FEET (2.1 M) CRUSH-RESISTANT OXYGEN TUBING, VINYL TIPPED: Brand: AIRLIFE™

## (undated) DEVICE — CATH GUID COR JL4.0 6FR 100CM -- LAUNCHER

## (undated) DEVICE — CATH GUID COR HSI 6FR 100CM -- LAUNCHER

## (undated) DEVICE — ANGIO-SEAL STS PLUS VASCULAR CLOSURE DEVICE: Brand: ANGIO-SEAL

## (undated) DEVICE — DRAPE PRB US TRNSDCR 6X96IN --

## (undated) DEVICE — ENDO CARRY-ON PROCEDURE KIT INCLUDES ENZYMATIC SPONGE, GAUZE, BIOHAZARD LABEL, TRAY, LUBRICANT, DIRTY SCOPE LABEL, WATER LABEL, TRAY, DRAWSTRING PAD, AND DEFENDO 4-PIECE KIT.: Brand: ENDO CARRY-ON PROCEDURE KIT

## (undated) DEVICE — NEONATAL-ADULT SPO2 SENSOR: Brand: NELLCOR

## (undated) DEVICE — CATH IV AUTOGRD BC BLU 22GA 25 -- INSYTE

## (undated) DEVICE — SOLIDIFIER FLUID 3000 CC ABSORB

## (undated) DEVICE — TR BAND RADIAL ARTERY COMPRESSION DEVICE: Brand: TR BAND

## (undated) DEVICE — CATH GUID COR EB40 6FR 100CM -- LAUNCHER

## (undated) DEVICE — SNARE ENDOSCP M L240CM W27MM SHTH DIA2.4MM CHN 2.8MM OVL